# Patient Record
Sex: MALE | Race: WHITE | NOT HISPANIC OR LATINO | Employment: OTHER | URBAN - METROPOLITAN AREA
[De-identification: names, ages, dates, MRNs, and addresses within clinical notes are randomized per-mention and may not be internally consistent; named-entity substitution may affect disease eponyms.]

---

## 2023-02-09 PROBLEM — D64.9 ANEMIA: Status: ACTIVE | Noted: 2023-02-09

## 2023-02-09 PROBLEM — E04.9 NODULAR GOITER: Status: ACTIVE | Noted: 2023-02-09

## 2023-02-09 PROBLEM — B02.29 POST HERPETIC NEURALGIA: Status: ACTIVE | Noted: 2023-02-09

## 2023-02-09 PROBLEM — J30.9 ALLERGIC RHINITIS WITH POSTNASAL DRIP: Status: ACTIVE | Noted: 2023-02-09

## 2023-02-09 PROBLEM — E66.812 CLASS 2 SEVERE OBESITY WITH SERIOUS COMORBIDITY AND BODY MASS INDEX (BMI) OF 35.0 TO 35.9 IN ADULT: Status: ACTIVE | Noted: 2023-02-09

## 2023-02-09 PROBLEM — H02.409 PTOSIS: Status: ACTIVE | Noted: 2023-02-09

## 2023-02-09 PROBLEM — M47.812 CERVICAL OSTEOARTHRITIS: Status: ACTIVE | Noted: 2023-02-09

## 2023-02-09 PROBLEM — E66.01 CLASS 2 SEVERE OBESITY WITH SERIOUS COMORBIDITY AND BODY MASS INDEX (BMI) OF 35.0 TO 35.9 IN ADULT (MULTI): Status: ACTIVE | Noted: 2023-02-09

## 2023-02-09 PROBLEM — G62.9 PERIPHERAL POLYNEUROPATHY: Status: ACTIVE | Noted: 2023-02-09

## 2023-02-09 PROBLEM — G25.81 RESTLESS LEG: Status: ACTIVE | Noted: 2023-02-09

## 2023-02-09 PROBLEM — C43.9 MELANOMA (MULTI): Status: ACTIVE | Noted: 2023-02-09

## 2023-02-09 PROBLEM — E04.1 COLLOID THYROID NODULE: Status: ACTIVE | Noted: 2023-02-09

## 2023-02-09 PROBLEM — R97.20 BPH WITH ELEVATED PSA: Status: ACTIVE | Noted: 2023-02-09

## 2023-02-09 PROBLEM — N40.0 BPH WITH ELEVATED PSA: Status: ACTIVE | Noted: 2023-02-09

## 2023-02-09 PROBLEM — G70.00 MYASTHENIA GRAVIS (MULTI): Status: ACTIVE | Noted: 2023-02-09

## 2023-02-09 PROBLEM — G56.20 ULNAR NEUROPATHY: Status: ACTIVE | Noted: 2023-02-09

## 2023-02-09 PROBLEM — K21.9 GERD (GASTROESOPHAGEAL REFLUX DISEASE): Status: ACTIVE | Noted: 2023-02-09

## 2023-02-09 PROBLEM — R09.82 ALLERGIC RHINITIS WITH POSTNASAL DRIP: Status: ACTIVE | Noted: 2023-02-09

## 2023-02-09 PROBLEM — R91.8 LUNG NODULES: Status: ACTIVE | Noted: 2023-02-09

## 2023-02-09 PROBLEM — I10 HYPERTENSION: Status: ACTIVE | Noted: 2023-02-09

## 2023-02-09 PROBLEM — L02.31 ABSCESS OF BUTTOCK: Status: ACTIVE | Noted: 2023-02-09

## 2023-02-09 PROBLEM — E11.40 CONTROLLED DIABETES MELLITUS WITH DIABETIC NEUROPATHY (MULTI): Status: ACTIVE | Noted: 2023-02-09

## 2023-02-09 PROBLEM — R26.89 POOR BALANCE: Status: ACTIVE | Noted: 2023-02-09

## 2023-02-09 PROBLEM — N39.41 URGE INCONTINENCE OF URINE: Status: ACTIVE | Noted: 2023-02-09

## 2023-02-09 PROBLEM — R39.12 WEAK URINARY STREAM: Status: ACTIVE | Noted: 2023-02-09

## 2023-02-09 PROBLEM — J45.909 AIRWAY HYPERREACTIVITY (HHS-HCC): Status: ACTIVE | Noted: 2023-02-09

## 2023-02-09 PROBLEM — N52.9 MALE ERECTILE DISORDER: Status: ACTIVE | Noted: 2023-02-09

## 2023-02-09 PROBLEM — E78.5 HYPERLIPIDEMIA: Status: ACTIVE | Noted: 2023-02-09

## 2023-02-09 RX ORDER — AMLODIPINE BESYLATE 10 MG/1
1 TABLET ORAL DAILY
COMMUNITY
Start: 2018-04-09 | End: 2023-06-26

## 2023-02-09 RX ORDER — KETOCONAZOLE 20 MG/ML
SHAMPOO, SUSPENSION TOPICAL
COMMUNITY
Start: 2021-08-12

## 2023-02-09 RX ORDER — MULTIVIT-MIN/IRON/FOLIC ACID/K 18-600-40
CAPSULE ORAL
COMMUNITY

## 2023-02-09 RX ORDER — PYRIDOSTIGMINE BROMIDE 60 MG/1
60 TABLET ORAL
COMMUNITY
Start: 2018-05-21

## 2023-02-09 RX ORDER — LOSARTAN POTASSIUM AND HYDROCHLOROTHIAZIDE 25; 100 MG/1; MG/1
1 TABLET ORAL DAILY
COMMUNITY
Start: 2017-09-06 | End: 2023-10-17

## 2023-02-09 RX ORDER — VITS A,C,E/LUTEIN/MINERALS 300MCG-200
TABLET ORAL
COMMUNITY

## 2023-02-09 RX ORDER — MULTIVIT WITH MINERALS/HERBS
TABLET ORAL
COMMUNITY

## 2023-02-09 RX ORDER — PRAVASTATIN SODIUM 20 MG/1
1 TABLET ORAL DAILY
COMMUNITY
Start: 2015-10-30 | End: 2023-03-16 | Stop reason: SDUPTHER

## 2023-02-09 RX ORDER — BLOOD SUGAR DIAGNOSTIC
STRIP MISCELLANEOUS
COMMUNITY
Start: 2019-07-15

## 2023-02-09 RX ORDER — METFORMIN HYDROCHLORIDE 500 MG/1
TABLET ORAL
COMMUNITY
Start: 2015-10-30 | End: 2023-03-22 | Stop reason: SDUPTHER

## 2023-03-16 DIAGNOSIS — E78.5 HYPERLIPIDEMIA, UNSPECIFIED HYPERLIPIDEMIA TYPE: Primary | ICD-10-CM

## 2023-03-16 RX ORDER — PRAVASTATIN SODIUM 20 MG/1
TABLET ORAL
Qty: 90 TABLET | Refills: 0 | Status: SHIPPED | OUTPATIENT
Start: 2023-03-16 | End: 2023-06-14

## 2023-03-22 ENCOUNTER — TELEPHONE (OUTPATIENT)
Dept: PRIMARY CARE | Facility: CLINIC | Age: 73
End: 2023-03-22

## 2023-03-22 ENCOUNTER — OFFICE VISIT (OUTPATIENT)
Dept: PRIMARY CARE | Facility: CLINIC | Age: 73
End: 2023-03-22
Payer: MEDICARE

## 2023-03-22 VITALS
HEART RATE: 88 BPM | TEMPERATURE: 97.3 F | HEIGHT: 68 IN | DIASTOLIC BLOOD PRESSURE: 70 MMHG | BODY MASS INDEX: 35.16 KG/M2 | WEIGHT: 232 LBS | SYSTOLIC BLOOD PRESSURE: 130 MMHG

## 2023-03-22 DIAGNOSIS — C43.59 MALIGNANT MELANOMA OF TORSO EXCLUDING BREAST (MULTI): ICD-10-CM

## 2023-03-22 DIAGNOSIS — Z12.5 PROSTATE CANCER SCREENING: ICD-10-CM

## 2023-03-22 DIAGNOSIS — E11.40 CONTROLLED TYPE 2 DIABETES MELLITUS WITH DIABETIC NEUROPATHY, WITHOUT LONG-TERM CURRENT USE OF INSULIN (MULTI): ICD-10-CM

## 2023-03-22 DIAGNOSIS — G70.00 MYASTHENIA GRAVIS (MULTI): ICD-10-CM

## 2023-03-22 DIAGNOSIS — J01.90 ACUTE NON-RECURRENT SINUSITIS, UNSPECIFIED LOCATION: Primary | ICD-10-CM

## 2023-03-22 DIAGNOSIS — I10 PRIMARY HYPERTENSION: ICD-10-CM

## 2023-03-22 DIAGNOSIS — E66.01 CLASS 2 SEVERE OBESITY DUE TO EXCESS CALORIES WITH SERIOUS COMORBIDITY AND BODY MASS INDEX (BMI) OF 35.0 TO 35.9 IN ADULT (MULTI): ICD-10-CM

## 2023-03-22 PROBLEM — L02.31 ABSCESS OF BUTTOCK: Status: RESOLVED | Noted: 2023-02-09 | Resolved: 2023-03-22

## 2023-03-22 LAB — POC HEMOGLOBIN A1C: 8.2 % (ref 4.2–6.5)

## 2023-03-22 PROCEDURE — 3008F BODY MASS INDEX DOCD: CPT | Performed by: FAMILY MEDICINE

## 2023-03-22 PROCEDURE — 1160F RVW MEDS BY RX/DR IN RCRD: CPT | Performed by: FAMILY MEDICINE

## 2023-03-22 PROCEDURE — 1157F ADVNC CARE PLAN IN RCRD: CPT | Performed by: FAMILY MEDICINE

## 2023-03-22 PROCEDURE — 1159F MED LIST DOCD IN RCRD: CPT | Performed by: FAMILY MEDICINE

## 2023-03-22 PROCEDURE — 3078F DIAST BP <80 MM HG: CPT | Performed by: FAMILY MEDICINE

## 2023-03-22 PROCEDURE — 1036F TOBACCO NON-USER: CPT | Performed by: FAMILY MEDICINE

## 2023-03-22 PROCEDURE — 99214 OFFICE O/P EST MOD 30 MIN: CPT | Performed by: FAMILY MEDICINE

## 2023-03-22 PROCEDURE — 3075F SYST BP GE 130 - 139MM HG: CPT | Performed by: FAMILY MEDICINE

## 2023-03-22 PROCEDURE — 83036 HEMOGLOBIN GLYCOSYLATED A1C: CPT | Performed by: FAMILY MEDICINE

## 2023-03-22 RX ORDER — METFORMIN HYDROCHLORIDE 500 MG/1
1000 TABLET ORAL
Qty: 360 TABLET | Refills: 1 | Status: SHIPPED | OUTPATIENT
Start: 2023-03-22 | End: 2023-07-03 | Stop reason: SDUPTHER

## 2023-03-22 RX ORDER — AMOXICILLIN 875 MG/1
875 TABLET, FILM COATED ORAL 2 TIMES DAILY
Qty: 20 TABLET | Refills: 0 | Status: SHIPPED | OUTPATIENT
Start: 2023-03-22 | End: 2023-04-01

## 2023-03-22 RX ORDER — METFORMIN HYDROCHLORIDE 500 MG/1
TABLET ORAL
Qty: 360 TABLET | Refills: 1 | Status: SHIPPED | OUTPATIENT
Start: 2023-03-22 | End: 2023-03-22 | Stop reason: SDUPTHER

## 2023-03-22 ASSESSMENT — ENCOUNTER SYMPTOMS: SINUS PRESSURE: 1

## 2023-03-22 ASSESSMENT — PATIENT HEALTH QUESTIONNAIRE - PHQ9
SUM OF ALL RESPONSES TO PHQ9 QUESTIONS 1 & 2: 0
2. FEELING DOWN, DEPRESSED OR HOPELESS: NOT AT ALL
1. LITTLE INTEREST OR PLEASURE IN DOING THINGS: NOT AT ALL

## 2023-03-22 NOTE — PROGRESS NOTES
"Subjective   Patient ID: Gavin Hawkins Jr. is a 72 y.o. male who presents for Diabetes and Sinus Problem (Went to urgent care 10 days ago finished doxycycline still having s/s.  A1C=8.2).    2 weeks  congestion yellow PND   has diarrhea this am also  finished doxycycline 4 days ago, watery   frequent BM   no nausea abdominal pain   Covid negative     Sinusitis  The current episode started 1 to 4 weeks ago. The problem is unchanged. There has been no fever. Associated symptoms include congestion and sinus pressure.        Review of Systems   HENT:  Positive for congestion and sinus pressure.        Objective   /70 (BP Location: Right arm, Patient Position: Sitting, BP Cuff Size: Large adult)   Pulse 88   Temp 36.3 °C (97.3 °F) (Temporal)   Ht 1.727 m (5' 8\")   Wt 105 kg (232 lb)   BMI 35.28 kg/m²     Physical Exam  Vitals and nursing note reviewed.   Constitutional:       General: He is not in acute distress.     Appearance: He is not ill-appearing.   HENT:      Head: Normocephalic and atraumatic.      Mouth/Throat:      Mouth: Mucous membranes are moist.   Eyes:      Conjunctiva/sclera: Conjunctivae normal.   Cardiovascular:      Rate and Rhythm: Normal rate and regular rhythm.      Heart sounds: Normal heart sounds.   Pulmonary:      Effort: Pulmonary effort is normal.      Breath sounds: Normal breath sounds.   Skin:     General: Skin is warm.   Neurological:      Mental Status: He is alert.   Psychiatric:         Mood and Affect: Mood normal.         Thought Content: Thought content normal.         Judgment: Judgment normal.         Assessment/Plan   Problem List Items Addressed This Visit          Nervous    Controlled diabetes mellitus with diabetic neuropathy (CMS/HCC)    Relevant Medications    metFORMIN (Glucophage) 500 mg tablet    Other Relevant Orders    POCT glycosylated hemoglobin (Hb A1C) manually resulted    Myasthenia gravis (CMS/[facility])     Seeing neurology  stable            Circulatory "    Hypertension       Endocrine/Metabolic    Class 2 severe obesity with serious comorbidity and body mass index (BMI) of 35.0 to 35.9 in adult (CMS/[facility])     Working on weight loss            Other    Melanoma (CMS/[facility])     2023 Back           Other Visit Diagnoses       Acute non-recurrent sinusitis, unspecified location    -  Primary    Relevant Medications    amoxicillin (Amoxil) 875 mg tablet

## 2023-06-13 DIAGNOSIS — E78.5 HYPERLIPIDEMIA, UNSPECIFIED HYPERLIPIDEMIA TYPE: ICD-10-CM

## 2023-06-14 RX ORDER — PRAVASTATIN SODIUM 20 MG/1
TABLET ORAL
Qty: 90 TABLET | Refills: 1 | Status: SHIPPED | OUTPATIENT
Start: 2023-06-14 | End: 2024-04-09

## 2023-06-25 DIAGNOSIS — I10 PRIMARY HYPERTENSION: ICD-10-CM

## 2023-06-26 ENCOUNTER — LAB (OUTPATIENT)
Dept: LAB | Facility: LAB | Age: 73
End: 2023-06-26
Payer: MEDICARE

## 2023-06-26 DIAGNOSIS — E11.40 CONTROLLED TYPE 2 DIABETES MELLITUS WITH DIABETIC NEUROPATHY, WITHOUT LONG-TERM CURRENT USE OF INSULIN (MULTI): ICD-10-CM

## 2023-06-26 LAB
ALANINE AMINOTRANSFERASE (SGPT) (U/L) IN SER/PLAS: 24 U/L (ref 10–52)
ALBUMIN (G/DL) IN SER/PLAS: 4.4 G/DL (ref 3.4–5)
ALKALINE PHOSPHATASE (U/L) IN SER/PLAS: 59 U/L (ref 33–136)
ANION GAP IN SER/PLAS: 12 MMOL/L (ref 10–20)
ASPARTATE AMINOTRANSFERASE (SGOT) (U/L) IN SER/PLAS: 17 U/L (ref 9–39)
BILIRUBIN TOTAL (MG/DL) IN SER/PLAS: 0.5 MG/DL (ref 0–1.2)
CALCIUM (MG/DL) IN SER/PLAS: 8.8 MG/DL (ref 8.6–10.3)
CARBON DIOXIDE, TOTAL (MMOL/L) IN SER/PLAS: 29 MMOL/L (ref 21–32)
CHLORIDE (MMOL/L) IN SER/PLAS: 104 MMOL/L (ref 98–107)
CHOLESTEROL (MG/DL) IN SER/PLAS: 146 MG/DL (ref 0–199)
CHOLESTEROL IN HDL (MG/DL) IN SER/PLAS: 53.8 MG/DL
CHOLESTEROL/HDL RATIO: 2.7
CREATININE (MG/DL) IN SER/PLAS: 1.02 MG/DL (ref 0.5–1.3)
ERYTHROCYTE DISTRIBUTION WIDTH (RATIO) BY AUTOMATED COUNT: 13.1 % (ref 11.5–14.5)
ERYTHROCYTE MEAN CORPUSCULAR HEMOGLOBIN CONCENTRATION (G/DL) BY AUTOMATED: 32.3 G/DL (ref 32–36)
ERYTHROCYTE MEAN CORPUSCULAR VOLUME (FL) BY AUTOMATED COUNT: 86 FL (ref 80–100)
ERYTHROCYTES (10*6/UL) IN BLOOD BY AUTOMATED COUNT: 4.75 X10E12/L (ref 4.5–5.9)
GFR MALE: 77 ML/MIN/1.73M2
GLUCOSE (MG/DL) IN SER/PLAS: 155 MG/DL (ref 74–99)
HEMATOCRIT (%) IN BLOOD BY AUTOMATED COUNT: 40.9 % (ref 41–52)
HEMOGLOBIN (G/DL) IN BLOOD: 13.2 G/DL (ref 13.5–17.5)
LDL: 68 MG/DL (ref 0–99)
LEUKOCYTES (10*3/UL) IN BLOOD BY AUTOMATED COUNT: 8.5 X10E9/L (ref 4.4–11.3)
PLATELETS (10*3/UL) IN BLOOD AUTOMATED COUNT: 204 X10E9/L (ref 150–450)
POTASSIUM (MMOL/L) IN SER/PLAS: 4.2 MMOL/L (ref 3.5–5.3)
PROTEIN TOTAL: 6.7 G/DL (ref 6.4–8.2)
SODIUM (MMOL/L) IN SER/PLAS: 141 MMOL/L (ref 136–145)
TRIGLYCERIDE (MG/DL) IN SER/PLAS: 119 MG/DL (ref 0–149)
UREA NITROGEN (MG/DL) IN SER/PLAS: 10 MG/DL (ref 6–23)
VLDL: 24 MG/DL (ref 0–40)

## 2023-06-26 PROCEDURE — 36415 COLL VENOUS BLD VENIPUNCTURE: CPT

## 2023-06-26 PROCEDURE — 80053 COMPREHEN METABOLIC PANEL: CPT

## 2023-06-26 PROCEDURE — 85027 COMPLETE CBC AUTOMATED: CPT

## 2023-06-26 PROCEDURE — 80061 LIPID PANEL: CPT

## 2023-06-26 RX ORDER — AMLODIPINE BESYLATE 10 MG/1
TABLET ORAL
Qty: 90 TABLET | Refills: 0 | Status: SHIPPED | OUTPATIENT
Start: 2023-06-26 | End: 2023-07-03 | Stop reason: SDUPTHER

## 2023-06-27 ENCOUNTER — APPOINTMENT (OUTPATIENT)
Dept: PRIMARY CARE | Facility: CLINIC | Age: 73
End: 2023-06-27
Payer: MEDICARE

## 2023-07-03 ENCOUNTER — OFFICE VISIT (OUTPATIENT)
Dept: PRIMARY CARE | Facility: CLINIC | Age: 73
End: 2023-07-03
Payer: MEDICARE

## 2023-07-03 VITALS
TEMPERATURE: 97.2 F | RESPIRATION RATE: 16 BRPM | BODY MASS INDEX: 35.43 KG/M2 | SYSTOLIC BLOOD PRESSURE: 118 MMHG | HEART RATE: 88 BPM | DIASTOLIC BLOOD PRESSURE: 70 MMHG | WEIGHT: 233 LBS

## 2023-07-03 DIAGNOSIS — Z12.5 PROSTATE CANCER SCREENING: ICD-10-CM

## 2023-07-03 DIAGNOSIS — E11.40 CONTROLLED TYPE 2 DIABETES MELLITUS WITH DIABETIC NEUROPATHY, WITHOUT LONG-TERM CURRENT USE OF INSULIN (MULTI): Primary | ICD-10-CM

## 2023-07-03 DIAGNOSIS — D64.9 ANEMIA, UNSPECIFIED TYPE: ICD-10-CM

## 2023-07-03 DIAGNOSIS — I10 PRIMARY HYPERTENSION: ICD-10-CM

## 2023-07-03 LAB — POC HEMOGLOBIN A1C: 7.7 % (ref 4.2–6.5)

## 2023-07-03 PROCEDURE — 3074F SYST BP LT 130 MM HG: CPT | Performed by: FAMILY MEDICINE

## 2023-07-03 PROCEDURE — 99214 OFFICE O/P EST MOD 30 MIN: CPT | Performed by: FAMILY MEDICINE

## 2023-07-03 PROCEDURE — 1036F TOBACCO NON-USER: CPT | Performed by: FAMILY MEDICINE

## 2023-07-03 PROCEDURE — 1159F MED LIST DOCD IN RCRD: CPT | Performed by: FAMILY MEDICINE

## 2023-07-03 PROCEDURE — 1157F ADVNC CARE PLAN IN RCRD: CPT | Performed by: FAMILY MEDICINE

## 2023-07-03 PROCEDURE — 1160F RVW MEDS BY RX/DR IN RCRD: CPT | Performed by: FAMILY MEDICINE

## 2023-07-03 PROCEDURE — 3008F BODY MASS INDEX DOCD: CPT | Performed by: FAMILY MEDICINE

## 2023-07-03 PROCEDURE — 83036 HEMOGLOBIN GLYCOSYLATED A1C: CPT | Performed by: FAMILY MEDICINE

## 2023-07-03 PROCEDURE — 3078F DIAST BP <80 MM HG: CPT | Performed by: FAMILY MEDICINE

## 2023-07-03 RX ORDER — SEMAGLUTIDE 0.68 MG/ML
INJECTION, SOLUTION SUBCUTANEOUS
Qty: 3 ML | Refills: 3 | Status: SHIPPED | OUTPATIENT
Start: 2023-07-03 | End: 2023-10-03 | Stop reason: ALTCHOICE

## 2023-07-03 RX ORDER — METFORMIN HYDROCHLORIDE 500 MG/1
500 TABLET ORAL
Qty: 180 TABLET | Refills: 1
Start: 2023-07-03 | End: 2023-08-24 | Stop reason: SDUPTHER

## 2023-07-03 RX ORDER — AMLODIPINE BESYLATE 10 MG/1
10 TABLET ORAL DAILY
Qty: 90 TABLET | Refills: 1 | Status: SHIPPED | OUTPATIENT
Start: 2023-07-03 | End: 2024-01-05 | Stop reason: SDUPTHER

## 2023-07-03 ASSESSMENT — PATIENT HEALTH QUESTIONNAIRE - PHQ9
2. FEELING DOWN, DEPRESSED OR HOPELESS: NOT AT ALL
SUM OF ALL RESPONSES TO PHQ9 QUESTIONS 1 & 2: 0
1. LITTLE INTEREST OR PLEASURE IN DOING THINGS: NOT AT ALL

## 2023-07-03 NOTE — PROGRESS NOTES
Subjective   Patient ID: Gavin Hawkins Jr. is a 73 y.o. male who presents for Diabetes.  Stools are more loose     CPAP working well          Review of Systems    Objective   /70   Pulse 88   Temp 36.2 °C (97.2 °F) (Temporal)   Resp 16   Wt 106 kg (233 lb)   BMI 35.43 kg/m²     Physical Exam  Vitals and nursing note reviewed.   Constitutional:       General: He is not in acute distress.     Appearance: He is not ill-appearing.   HENT:      Head: Normocephalic and atraumatic.      Mouth/Throat:      Mouth: Mucous membranes are moist.   Eyes:      Conjunctiva/sclera: Conjunctivae normal.   Cardiovascular:      Rate and Rhythm: Normal rate and regular rhythm.      Heart sounds: Normal heart sounds.   Pulmonary:      Effort: Pulmonary effort is normal.      Breath sounds: Normal breath sounds.   Skin:     General: Skin is warm.   Neurological:      Mental Status: He is alert.   Psychiatric:         Mood and Affect: Mood normal.         Thought Content: Thought content normal.         Judgment: Judgment normal.         Assessment/Plan   Problem List Items Addressed This Visit       Anemia    Relevant Orders    CBC    Iron and TIBC    Ferritin    Vitamin B12    Folate    Reticulocytes    Controlled diabetes mellitus with diabetic neuropathy (CMS/HCC) - Primary    Relevant Medications    semaglutide (Ozempic) 0.25 mg or 0.5 mg (2 mg/3 mL) pen injector    metFORMIN (Glucophage) 500 mg tablet    Other Relevant Orders    POCT glycosylated hemoglobin (Hb A1C) manually resulted (Completed)    Hemoglobin A1C    Follow Up In [facility] - PCP - Established    Hypertension    Relevant Medications    amLODIPine (Norvasc) 10 mg tablet     Other Visit Diagnoses       Prostate cancer screening        Relevant Orders    Prostate Specific Antigen

## 2023-07-03 NOTE — PATIENT INSTRUCTIONS
Get your labs before your next appointment. The order is in [employer]. You can go to any  lab to have it done.

## 2023-08-21 ENCOUNTER — DOCUMENTATION (OUTPATIENT)
Dept: PRIMARY CARE | Facility: CLINIC | Age: 73
End: 2023-08-21
Payer: MEDICARE

## 2023-08-22 ENCOUNTER — PATIENT OUTREACH (OUTPATIENT)
Dept: PRIMARY CARE | Facility: CLINIC | Age: 73
End: 2023-08-22
Payer: MEDICARE

## 2023-08-22 NOTE — PROGRESS NOTES
Discharge Facility: [facility]  Discharge Diagnosis:Coffee ground emesis  Admission Date: 8/20/2023  Discharge Date: 8/22/2023    PCP Appointment Date: NONE  Specialist Appointment Date: NONE  Hospital Encounter and Summary: Linked     Left message x 2 -no call back

## 2023-08-24 ENCOUNTER — LAB (OUTPATIENT)
Dept: LAB | Facility: LAB | Age: 73
End: 2023-08-24
Payer: MEDICARE

## 2023-08-24 ENCOUNTER — OFFICE VISIT (OUTPATIENT)
Dept: PRIMARY CARE | Facility: CLINIC | Age: 73
End: 2023-08-24
Payer: MEDICARE

## 2023-08-24 VITALS
WEIGHT: 220.6 LBS | DIASTOLIC BLOOD PRESSURE: 76 MMHG | HEART RATE: 80 BPM | BODY MASS INDEX: 33.43 KG/M2 | TEMPERATURE: 97.5 F | SYSTOLIC BLOOD PRESSURE: 122 MMHG | RESPIRATION RATE: 16 BRPM | HEIGHT: 68 IN

## 2023-08-24 DIAGNOSIS — K92.2 UPPER GASTROINTESTINAL BLEED: ICD-10-CM

## 2023-08-24 DIAGNOSIS — E11.40 CONTROLLED TYPE 2 DIABETES MELLITUS WITH DIABETIC NEUROPATHY, WITHOUT LONG-TERM CURRENT USE OF INSULIN (MULTI): ICD-10-CM

## 2023-08-24 DIAGNOSIS — K92.2 UPPER GASTROINTESTINAL BLEED: Primary | ICD-10-CM

## 2023-08-24 LAB
ERYTHROCYTE DISTRIBUTION WIDTH (RATIO) BY AUTOMATED COUNT: 13.7 % (ref 11.5–14.5)
ERYTHROCYTE MEAN CORPUSCULAR HEMOGLOBIN CONCENTRATION (G/DL) BY AUTOMATED: 32.7 G/DL (ref 32–36)
ERYTHROCYTE MEAN CORPUSCULAR VOLUME (FL) BY AUTOMATED COUNT: 87 FL (ref 80–100)
ERYTHROCYTES (10*6/UL) IN BLOOD BY AUTOMATED COUNT: 4.54 X10E12/L (ref 4.5–5.9)
HEMATOCRIT (%) IN BLOOD BY AUTOMATED COUNT: 39.7 % (ref 41–52)
HEMOGLOBIN (G/DL) IN BLOOD: 13 G/DL (ref 13.5–17.5)
LEUKOCYTES (10*3/UL) IN BLOOD BY AUTOMATED COUNT: 9.9 X10E9/L (ref 4.4–11.3)
PLATELETS (10*3/UL) IN BLOOD AUTOMATED COUNT: 224 X10E9/L (ref 150–450)

## 2023-08-24 PROCEDURE — 99213 OFFICE O/P EST LOW 20 MIN: CPT | Performed by: FAMILY MEDICINE

## 2023-08-24 PROCEDURE — 1125F AMNT PAIN NOTED PAIN PRSNT: CPT | Performed by: FAMILY MEDICINE

## 2023-08-24 PROCEDURE — 1160F RVW MEDS BY RX/DR IN RCRD: CPT | Performed by: FAMILY MEDICINE

## 2023-08-24 PROCEDURE — 3008F BODY MASS INDEX DOCD: CPT | Performed by: FAMILY MEDICINE

## 2023-08-24 PROCEDURE — 1036F TOBACCO NON-USER: CPT | Performed by: FAMILY MEDICINE

## 2023-08-24 PROCEDURE — 3074F SYST BP LT 130 MM HG: CPT | Performed by: FAMILY MEDICINE

## 2023-08-24 PROCEDURE — 1157F ADVNC CARE PLAN IN RCRD: CPT | Performed by: FAMILY MEDICINE

## 2023-08-24 PROCEDURE — 1159F MED LIST DOCD IN RCRD: CPT | Performed by: FAMILY MEDICINE

## 2023-08-24 PROCEDURE — 85027 COMPLETE CBC AUTOMATED: CPT

## 2023-08-24 PROCEDURE — 3078F DIAST BP <80 MM HG: CPT | Performed by: FAMILY MEDICINE

## 2023-08-24 PROCEDURE — 36415 COLL VENOUS BLD VENIPUNCTURE: CPT

## 2023-08-24 RX ORDER — OMEPRAZOLE 40 MG/1
40 CAPSULE, DELAYED RELEASE ORAL
Qty: 30 CAPSULE | Refills: 1
Start: 2023-08-24 | End: 2024-05-17 | Stop reason: SDUPTHER

## 2023-08-24 RX ORDER — METFORMIN HYDROCHLORIDE 1000 MG/1
1000 TABLET ORAL
Qty: 180 TABLET | Refills: 1
Start: 2023-08-24 | End: 2023-12-01 | Stop reason: SDUPTHER

## 2023-08-24 NOTE — PROGRESS NOTES
"Subjective   Patient ID: Gavin Hawkins Jr. is a 73 y.o. male who presents for Follow-up (Patient here for ER F/U from [facility] on 8/20/23 for GI bleed. ).    Burped and vomited coffee ground   went to the ER   ?from Ozempic   wants to stop and see if improved  diarrhea worsening after started Ozempic    Seeing GI next month          Review of Systems    Objective   /76   Pulse 80   Temp 36.4 °C (97.5 °F)   Resp 16   Ht 1.727 m (5' 8\")   Wt 100 kg (220 lb 9.6 oz)   BMI 33.54 kg/m²     Physical Exam  Vitals and nursing note reviewed.   Constitutional:       General: He is not in acute distress.     Appearance: He is not ill-appearing.   HENT:      Head: Normocephalic and atraumatic.      Mouth/Throat:      Mouth: Mucous membranes are moist.   Eyes:      Conjunctiva/sclera: Conjunctivae normal.   Cardiovascular:      Rate and Rhythm: Normal rate and regular rhythm.      Heart sounds: Normal heart sounds.   Pulmonary:      Effort: Pulmonary effort is normal.      Breath sounds: Normal breath sounds.   Abdominal:      Tenderness: There is no abdominal tenderness.   Skin:     General: Skin is warm.   Neurological:      Mental Status: He is alert.   Psychiatric:         Mood and Affect: Mood normal.         Thought Content: Thought content normal.         Judgment: Judgment normal.         Assessment/Plan   Problem List Items Addressed This Visit       Controlled diabetes mellitus with diabetic neuropathy (CMS/HCC)    Relevant Medications    metFORMIN (Glucophage) 1,000 mg tablet    Upper gastrointestinal bleed - Primary    Relevant Medications    omeprazole (PriLOSEC) 40 mg DR capsule    Other Relevant Orders    CBC          "
(3) walks occasionally

## 2023-09-01 ENCOUNTER — PATIENT OUTREACH (OUTPATIENT)
Dept: PRIMARY CARE | Facility: CLINIC | Age: 73
End: 2023-09-01
Payer: MEDICARE

## 2023-09-01 NOTE — PROGRESS NOTES
Call regarding appt. with PCP on 8/24/2023 after hospitalization.  At time of outreach call the patient feels as if their condition has improved since last visit.  Reviewed the PCP appointment with the pt and addressed any questions or concerns.    Admission

## 2023-10-02 ENCOUNTER — PATIENT OUTREACH (OUTPATIENT)
Dept: PRIMARY CARE | Facility: CLINIC | Age: 73
End: 2023-10-02
Payer: MEDICARE

## 2023-10-02 NOTE — PROGRESS NOTES
Unable to reach patient for one month post discharge follow up call.   LVM with call back number for patient to call if needed        normal... Well appearing, awake, alert, oriented to person, place, time/situation and in no apparent distress.

## 2023-10-03 ENCOUNTER — LAB (OUTPATIENT)
Dept: LAB | Facility: LAB | Age: 73
End: 2023-10-03
Payer: MEDICARE

## 2023-10-03 ENCOUNTER — OFFICE VISIT (OUTPATIENT)
Dept: PRIMARY CARE | Facility: CLINIC | Age: 73
End: 2023-10-03
Payer: MEDICARE

## 2023-10-03 VITALS
DIASTOLIC BLOOD PRESSURE: 74 MMHG | HEART RATE: 78 BPM | HEIGHT: 68 IN | WEIGHT: 227.4 LBS | SYSTOLIC BLOOD PRESSURE: 130 MMHG | TEMPERATURE: 97.6 F | BODY MASS INDEX: 34.46 KG/M2 | RESPIRATION RATE: 16 BRPM

## 2023-10-03 DIAGNOSIS — E11.40 CONTROLLED TYPE 2 DIABETES MELLITUS WITH DIABETIC NEUROPATHY, WITHOUT LONG-TERM CURRENT USE OF INSULIN (MULTI): Primary | ICD-10-CM

## 2023-10-03 DIAGNOSIS — Z12.5 PROSTATE CANCER SCREENING: ICD-10-CM

## 2023-10-03 DIAGNOSIS — D64.9 ANEMIA, UNSPECIFIED TYPE: ICD-10-CM

## 2023-10-03 DIAGNOSIS — E11.40 CONTROLLED TYPE 2 DIABETES MELLITUS WITH DIABETIC NEUROPATHY, WITHOUT LONG-TERM CURRENT USE OF INSULIN (MULTI): ICD-10-CM

## 2023-10-03 DIAGNOSIS — G70.00 MYASTHENIA GRAVIS (MULTI): ICD-10-CM

## 2023-10-03 DIAGNOSIS — Z23 IMMUNIZATION DUE: ICD-10-CM

## 2023-10-03 DIAGNOSIS — Z00.00 ROUTINE GENERAL MEDICAL EXAMINATION AT A HEALTH CARE FACILITY: ICD-10-CM

## 2023-10-03 LAB
CREAT UR-MCNC: 112.6 MG/DL (ref 20–370)
ERYTHROCYTE [DISTWIDTH] IN BLOOD BY AUTOMATED COUNT: 13.9 % (ref 11.5–14.5)
FERRITIN SERPL-MCNC: 46 NG/ML (ref 20–300)
FOLATE SERPL-MCNC: >22.3 NG/ML
HCT VFR BLD AUTO: 40.2 % (ref 41–52)
HGB BLD-MCNC: 13 G/DL (ref 13.5–17.5)
HGB RETIC QN: 32 PG (ref 28–38)
IMMATURE RETIC FRACTION: 13.2 %
IRON SATN MFR SERPL: 20 % (ref 25–45)
IRON SERPL-MCNC: 76 UG/DL (ref 35–150)
MCH RBC QN AUTO: 28.3 PG (ref 26–34)
MCHC RBC AUTO-ENTMCNC: 32.3 G/DL (ref 32–36)
MCV RBC AUTO: 88 FL (ref 80–100)
MICROALBUMIN UR-MCNC: 22.7 MG/L
MICROALBUMIN/CREAT UR: 20.2 UG/MG CREAT
NRBC BLD-RTO: 0 /100 WBCS (ref 0–0)
PLATELET # BLD AUTO: 210 X10*3/UL (ref 150–450)
PMV BLD AUTO: 10.7 FL (ref 7.5–11.5)
POC HEMOGLOBIN A1C: 6.9 % (ref 4.2–6.5)
PSA SERPL-MCNC: 2.58 NG/ML
RBC # BLD AUTO: 4.59 X10*6/UL (ref 4.5–5.9)
RETICS #: 0.08 X10*6/UL (ref 0.02–0.11)
RETICS/RBC NFR AUTO: 1.8 % (ref 0.5–2)
TIBC SERPL-MCNC: 389 UG/DL (ref 240–445)
UIBC SERPL-MCNC: 313 UG/DL (ref 110–370)
WBC # BLD AUTO: 8.2 X10*3/UL (ref 4.4–11.3)

## 2023-10-03 PROCEDURE — 99213 OFFICE O/P EST LOW 20 MIN: CPT | Performed by: FAMILY MEDICINE

## 2023-10-03 PROCEDURE — 90662 IIV NO PRSV INCREASED AG IM: CPT | Performed by: FAMILY MEDICINE

## 2023-10-03 PROCEDURE — 1036F TOBACCO NON-USER: CPT | Performed by: FAMILY MEDICINE

## 2023-10-03 PROCEDURE — 3078F DIAST BP <80 MM HG: CPT | Performed by: FAMILY MEDICINE

## 2023-10-03 PROCEDURE — 3075F SYST BP GE 130 - 139MM HG: CPT | Performed by: FAMILY MEDICINE

## 2023-10-03 PROCEDURE — 1125F AMNT PAIN NOTED PAIN PRSNT: CPT | Performed by: FAMILY MEDICINE

## 2023-10-03 PROCEDURE — 36415 COLL VENOUS BLD VENIPUNCTURE: CPT

## 2023-10-03 PROCEDURE — 83036 HEMOGLOBIN GLYCOSYLATED A1C: CPT | Performed by: FAMILY MEDICINE

## 2023-10-03 PROCEDURE — 3008F BODY MASS INDEX DOCD: CPT | Performed by: FAMILY MEDICINE

## 2023-10-03 PROCEDURE — 1159F MED LIST DOCD IN RCRD: CPT | Performed by: FAMILY MEDICINE

## 2023-10-03 PROCEDURE — 1160F RVW MEDS BY RX/DR IN RCRD: CPT | Performed by: FAMILY MEDICINE

## 2023-10-03 PROCEDURE — G0008 ADMIN INFLUENZA VIRUS VAC: HCPCS | Performed by: FAMILY MEDICINE

## 2023-10-03 ASSESSMENT — ENCOUNTER SYMPTOMS: DIABETIC ASSOCIATED SYMPTOMS: 0

## 2023-10-03 NOTE — PATIENT INSTRUCTIONS
You need to get the RSV vaccine at the pharmacy.    You can get the new COVID booster at the pharmacy.

## 2023-10-03 NOTE — PROGRESS NOTES
"Subjective   Patient ID: Gavin Hawkins Jr. is a 73 y.o. male who presents for Diabetes and Hypertension.    Memory doing good     Diabetes  He has type 2 diabetes mellitus. His disease course has been improving. There are no hypoglycemic associated symptoms. There are no diabetic associated symptoms. There are no hypoglycemic complications. Diabetic complications include peripheral neuropathy. An ACE inhibitor/angiotensin II receptor blocker is being taken. He does not see a podiatrist.Eye exam is current.        Review of Systems    Objective   /74   Pulse 78   Temp 36.4 °C (97.6 °F)   Resp 16   Ht 1.727 m (5' 8\")   Wt 103 kg (227 lb 6.4 oz)   BMI 34.58 kg/m²     Physical Exam    Assessment/Plan   Problem List Items Addressed This Visit             ICD-10-CM    Anemia D64.9    Relevant Orders    CBC    Folate    Iron and TIBC    Controlled diabetes mellitus with diabetic neuropathy (CMS/HCC) - Primary E11.40    Relevant Orders    POCT glycosylated hemoglobin (Hb A1C) manually resulted (Completed)    Albumin , Urine Random    Myasthenia gravis (CMS/[facility]) G70.00    Relevant Orders    Disability Placard     Other Visit Diagnoses         Codes    Immunization due     Z23    Relevant Orders    Flu vaccine, high dose seasonal, preservative free (Completed)    Prostate cancer screening     Z12.5    Relevant Orders    Prostate Specific Antigen    Routine general medical examination at a health care facility     Z00.00    Relevant Orders    Follow Up In [facility] - PCP - Medicare Annual               "

## 2023-10-04 LAB
EST. AVERAGE GLUCOSE BLD GHB EST-MCNC: 148 MG/DL
HBA1C MFR BLD: 6.8 %

## 2023-10-17 DIAGNOSIS — I10 PRIMARY HYPERTENSION: Primary | ICD-10-CM

## 2023-10-17 RX ORDER — LOSARTAN POTASSIUM AND HYDROCHLOROTHIAZIDE 25; 100 MG/1; MG/1
1 TABLET ORAL
Qty: 90 TABLET | Refills: 1 | Status: SHIPPED | OUTPATIENT
Start: 2023-10-17 | End: 2024-01-05 | Stop reason: SDUPTHER

## 2023-11-22 ENCOUNTER — PATIENT OUTREACH (OUTPATIENT)
Dept: PRIMARY CARE | Facility: CLINIC | Age: 73
End: 2023-11-22
Payer: MEDICARE

## 2023-11-22 NOTE — PROGRESS NOTES
Patient has met target of no readmission for (90) days post hospital discharge and is graduated from Transitional Care Management program at this time.

## 2023-12-01 DIAGNOSIS — E11.40 CONTROLLED TYPE 2 DIABETES MELLITUS WITH DIABETIC NEUROPATHY, WITHOUT LONG-TERM CURRENT USE OF INSULIN (MULTI): ICD-10-CM

## 2023-12-01 RX ORDER — METFORMIN HYDROCHLORIDE 1000 MG/1
1000 TABLET ORAL
Qty: 180 TABLET | Refills: 1 | Status: SHIPPED | OUTPATIENT
Start: 2023-12-01 | End: 2024-05-24

## 2024-01-05 ENCOUNTER — LAB (OUTPATIENT)
Dept: LAB | Facility: LAB | Age: 74
End: 2024-01-05
Payer: MEDICARE

## 2024-01-05 ENCOUNTER — OFFICE VISIT (OUTPATIENT)
Dept: PRIMARY CARE | Facility: CLINIC | Age: 74
End: 2024-01-05
Payer: MEDICARE

## 2024-01-05 VITALS
WEIGHT: 237 LBS | TEMPERATURE: 96 F | BODY MASS INDEX: 35.92 KG/M2 | RESPIRATION RATE: 16 BRPM | HEART RATE: 82 BPM | HEIGHT: 68 IN | DIASTOLIC BLOOD PRESSURE: 78 MMHG | SYSTOLIC BLOOD PRESSURE: 130 MMHG

## 2024-01-05 DIAGNOSIS — Z00.00 ROUTINE GENERAL MEDICAL EXAMINATION AT HEALTH CARE FACILITY: Primary | ICD-10-CM

## 2024-01-05 DIAGNOSIS — E11.40 CONTROLLED TYPE 2 DIABETES MELLITUS WITH DIABETIC NEUROPATHY, WITHOUT LONG-TERM CURRENT USE OF INSULIN (MULTI): ICD-10-CM

## 2024-01-05 DIAGNOSIS — I10 PRIMARY HYPERTENSION: ICD-10-CM

## 2024-01-05 DIAGNOSIS — Z00.00 ROUTINE GENERAL MEDICAL EXAMINATION AT A HEALTH CARE FACILITY: ICD-10-CM

## 2024-01-05 DIAGNOSIS — Z12.5 PROSTATE CANCER SCREENING: ICD-10-CM

## 2024-01-05 DIAGNOSIS — D64.9 ANEMIA, UNSPECIFIED TYPE: ICD-10-CM

## 2024-01-05 PROBLEM — R39.12 WEAK URINARY STREAM: Status: RESOLVED | Noted: 2023-02-09 | Resolved: 2024-01-05

## 2024-01-05 LAB
ALBUMIN SERPL BCP-MCNC: 4.3 G/DL (ref 3.4–5)
ALP SERPL-CCNC: 60 U/L (ref 33–136)
ALT SERPL W P-5'-P-CCNC: 37 U/L (ref 10–52)
ANION GAP SERPL CALC-SCNC: 13 MMOL/L (ref 10–20)
AST SERPL W P-5'-P-CCNC: 21 U/L (ref 9–39)
BILIRUB SERPL-MCNC: 0.6 MG/DL (ref 0–1.2)
BUN SERPL-MCNC: 16 MG/DL (ref 6–23)
CALCIUM SERPL-MCNC: 9 MG/DL (ref 8.6–10.3)
CHLORIDE SERPL-SCNC: 100 MMOL/L (ref 98–107)
CO2 SERPL-SCNC: 29 MMOL/L (ref 21–32)
CREAT SERPL-MCNC: 1 MG/DL (ref 0.5–1.3)
ERYTHROCYTE [DISTWIDTH] IN BLOOD BY AUTOMATED COUNT: 13.5 % (ref 11.5–14.5)
GFR SERPL CREATININE-BSD FRML MDRD: 79 ML/MIN/1.73M*2
GLUCOSE SERPL-MCNC: 156 MG/DL (ref 74–99)
HCT VFR BLD AUTO: 41 % (ref 41–52)
HGB BLD-MCNC: 13.5 G/DL (ref 13.5–17.5)
MCH RBC QN AUTO: 27.7 PG (ref 26–34)
MCHC RBC AUTO-ENTMCNC: 32.9 G/DL (ref 32–36)
MCV RBC AUTO: 84 FL (ref 80–100)
NRBC BLD-RTO: 0 /100 WBCS (ref 0–0)
PLATELET # BLD AUTO: 203 X10*3/UL (ref 150–450)
POC HEMOGLOBIN A1C: 8.4 % (ref 4.2–6.5)
POTASSIUM SERPL-SCNC: 3.6 MMOL/L (ref 3.5–5.3)
PROT SERPL-MCNC: 7.1 G/DL (ref 6.4–8.2)
PSA SERPL-MCNC: 2.95 NG/ML
RBC # BLD AUTO: 4.87 X10*6/UL (ref 4.5–5.9)
SODIUM SERPL-SCNC: 138 MMOL/L (ref 136–145)
WBC # BLD AUTO: 8 X10*3/UL (ref 4.4–11.3)

## 2024-01-05 PROCEDURE — G0103 PSA SCREENING: HCPCS

## 2024-01-05 PROCEDURE — 83036 HEMOGLOBIN GLYCOSYLATED A1C: CPT | Performed by: FAMILY MEDICINE

## 2024-01-05 PROCEDURE — 3075F SYST BP GE 130 - 139MM HG: CPT | Performed by: FAMILY MEDICINE

## 2024-01-05 PROCEDURE — 1036F TOBACCO NON-USER: CPT | Performed by: FAMILY MEDICINE

## 2024-01-05 PROCEDURE — 3078F DIAST BP <80 MM HG: CPT | Performed by: FAMILY MEDICINE

## 2024-01-05 PROCEDURE — 36415 COLL VENOUS BLD VENIPUNCTURE: CPT

## 2024-01-05 PROCEDURE — 1159F MED LIST DOCD IN RCRD: CPT | Performed by: FAMILY MEDICINE

## 2024-01-05 PROCEDURE — 80053 COMPREHEN METABOLIC PANEL: CPT

## 2024-01-05 PROCEDURE — 1125F AMNT PAIN NOTED PAIN PRSNT: CPT | Performed by: FAMILY MEDICINE

## 2024-01-05 PROCEDURE — 1170F FXNL STATUS ASSESSED: CPT | Performed by: FAMILY MEDICINE

## 2024-01-05 PROCEDURE — 3008F BODY MASS INDEX DOCD: CPT | Performed by: FAMILY MEDICINE

## 2024-01-05 PROCEDURE — G0439 PPPS, SUBSEQ VISIT: HCPCS | Performed by: FAMILY MEDICINE

## 2024-01-05 PROCEDURE — 1160F RVW MEDS BY RX/DR IN RCRD: CPT | Performed by: FAMILY MEDICINE

## 2024-01-05 PROCEDURE — 85027 COMPLETE CBC AUTOMATED: CPT

## 2024-01-05 RX ORDER — PIOGLITAZONEHYDROCHLORIDE 15 MG/1
15 TABLET ORAL DAILY
Qty: 90 TABLET | Refills: 1 | Status: SHIPPED | OUTPATIENT
Start: 2024-01-05

## 2024-01-05 RX ORDER — AMLODIPINE BESYLATE 10 MG/1
10 TABLET ORAL DAILY
Qty: 90 TABLET | Refills: 1 | Status: SHIPPED | OUTPATIENT
Start: 2024-01-05

## 2024-01-05 RX ORDER — LOSARTAN POTASSIUM AND HYDROCHLOROTHIAZIDE 25; 100 MG/1; MG/1
1 TABLET ORAL DAILY
Qty: 90 TABLET | Refills: 1 | Status: SHIPPED | OUTPATIENT
Start: 2024-01-05

## 2024-01-05 ASSESSMENT — ACTIVITIES OF DAILY LIVING (ADL)
TAKING_MEDICATION: INDEPENDENT
GROCERY_SHOPPING: INDEPENDENT
BATHING: INDEPENDENT
DRESSING: INDEPENDENT
MANAGING_FINANCES: INDEPENDENT
DOING_HOUSEWORK: INDEPENDENT

## 2024-01-05 ASSESSMENT — ENCOUNTER SYMPTOMS
LOSS OF SENSATION IN FEET: 0
DEPRESSION: 0
OCCASIONAL FEELINGS OF UNSTEADINESS: 0

## 2024-01-05 ASSESSMENT — PATIENT HEALTH QUESTIONNAIRE - PHQ9
SUM OF ALL RESPONSES TO PHQ9 QUESTIONS 1 & 2: 0
2. FEELING DOWN, DEPRESSED OR HOPELESS: NOT AT ALL
1. LITTLE INTEREST OR PLEASURE IN DOING THINGS: NOT AT ALL
1. LITTLE INTEREST OR PLEASURE IN DOING THINGS: NOT AT ALL
2. FEELING DOWN, DEPRESSED OR HOPELESS: NOT AT ALL
SUM OF ALL RESPONSES TO PHQ9 QUESTIONS 1 AND 2: 0

## 2024-01-05 NOTE — PROGRESS NOTES
"Subjective   Reason for Visit: Gavin Hawkins Jr. is an 73 y.o. male here for a Medicare Wellness visit.     Past Medical, Surgical, and Family History reviewed and updated in chart.    Reviewed all medications by prescribing practitioner or clinical pharmacist (such as prescriptions, OTCs, herbal therapies and supplements) and documented in the medical record.    Going to see urologist for urinary issues so wants to avoid Jardiance etc        Patient Care Team:  No Reynolds MD as PCP - General (Family Medicine)  No Reynolds MD [facility] PCP [facility] [place] [place] [facility]     Review of Systems    Objective   Vitals:  /78   Pulse 82   Temp 35.6 °C (96 °F) (Temporal)   Resp 16   Ht 1.727 m (5' 8\")   Wt 108 kg (237 lb)   BMI 36.04 kg/m²       Physical Exam  Vitals and nursing note reviewed.   Constitutional:       General: He is not in acute distress.     Appearance: He is not ill-appearing.   HENT:      Head: Normocephalic and atraumatic.      Mouth/Throat:      Mouth: Mucous membranes are moist.   Eyes:      Conjunctiva/sclera: Conjunctivae normal.   Cardiovascular:      Rate and Rhythm: Normal rate and regular rhythm.      Heart sounds: Normal heart sounds.   Pulmonary:      Effort: Pulmonary effort is normal.      Breath sounds: Normal breath sounds.   Skin:     General: Skin is warm.   Neurological:      Mental Status: He is alert.   Psychiatric:         Mood and Affect: Mood normal.         Thought Content: Thought content normal.         Judgment: Judgment normal.         Assessment/Plan   Problem List Items Addressed This Visit       Controlled diabetes mellitus with diabetic neuropathy (CMS/[facility])    Relevant Medications    pioglitazone (Actos) 15 mg tablet    Other Relevant Orders    POCT glycosylated hemoglobin (Hb A1C) manually resulted (Completed)    CBC    Comprehensive Metabolic Panel    Hypertension    Relevant Medications    losartan-hydrochlorothiazide (Hyzaar) 100-25 mg tablet    " amLODIPine (Norvasc) 10 mg tablet     Other Visit Diagnoses       Routine general medical examination at health care facility    -  Primary    Routine general medical examination at a health care facility

## 2024-01-10 DIAGNOSIS — E11.40 CONTROLLED TYPE 2 DIABETES MELLITUS WITH DIABETIC NEUROPATHY, WITHOUT LONG-TERM CURRENT USE OF INSULIN (MULTI): Primary | ICD-10-CM

## 2024-01-22 ENCOUNTER — TELEMEDICINE (OUTPATIENT)
Dept: PHARMACY | Facility: HOSPITAL | Age: 74
End: 2024-01-22
Payer: MEDICARE

## 2024-01-22 DIAGNOSIS — E11.40 CONTROLLED TYPE 2 DIABETES MELLITUS WITH DIABETIC NEUROPATHY, WITHOUT LONG-TERM CURRENT USE OF INSULIN (MULTI): ICD-10-CM

## 2024-01-22 NOTE — PROGRESS NOTES
[facility] Pharmacist Visit - Diabetes Management  Referring Provider: No Reynolds MD    Subjective   Gavin Hawkins Jr. is a 73 y.o. male who presents for No chief complaint on file..    HPI  PMH significant for obesity, peripheral polyneuropathy, melanoma, HLD, HTN, GERD, T2DM .  Patient has no known history of medullary thyroid cancer, pancreatitis, or complicated UTI.  Diagnosed 2005. Known DM complications include peripheral neuropathy and obesity.  Special needs/barriers to therapy: none    Lifestyle  Diet: *** meals/day. ***  BK: ***  LN: ***  DN: ***  Snacks: ***  Drinks: ***    Physical Activity: no    Diabetes Management  Current Medications: metformin 1000 mg BID and pioglitazone 15 mg daily ( just started 2 weeks ago)  Previous Medications: Ozempic - GI bleed     Adherence: Takes medication as directed and reports no missed doses  Adverse Effects: none    Risk Reducing Meds  On ACEi/ARB: Yes   On SGLT2i: No   On GLP1-RA: No   On Statin: Yes     Glucose Monitoring  Glucometer/CGM Type: One Touch ultra    Current home BG readings: 160 fasting     Hypoglycemia: Patient denies signs and symptoms  Hyperglycemia: {Hyperglycemia:17445}    Diabetic Eye Exam: Up to date, completed ***  Monofilament Foot Exam: Up to date, completed ***      Secondary Prevention  ASCVD Risk:   The 10-year ASCVD risk score (Samantha PURDY, et al., 2019) is: 41.3%    Values used to calculate the score:      Age: 73 years      Sex: Male      Is Non- : No      Diabetic: Yes      Tobacco smoker: No      Systolic Blood Pressure: 130 mmHg      Is BP treated: Yes      HDL Cholesterol: 53.8 mg/dL      Total Cholesterol: 146 mg/dL  On Statin?: Yes, pravastain 20 mg    BP Readings from Last 3 Encounters:   01/05/24 130/78   10/03/23 130/74   08/24/23 122/76     Current HTN Regimen: amlodipine 10 mg, losartan HCTZ  HTN at goal? Yes    Immunizations Needed: Tdap  Tobacco Use: non-smoker      Objective   Vitals  BP Readings  "from Last 2 Encounters:   01/05/24 130/78   10/03/23 130/74     BMI Readings from Last 1 Encounters:   01/05/24 36.04 kg/m²      Labs  A1C  Lab Results   Component Value Date    HGBA1C 8.4 (A) 01/05/2024    HGBA1C 6.8 (H) 10/03/2023    HGBA1C 6.9 (A) 10/03/2023     BMP  Lab Results   Component Value Date    CALCIUM 9.0 01/05/2024     01/05/2024    K 3.6 01/05/2024    CO2 29 01/05/2024     01/05/2024    BUN 16 01/05/2024    CREATININE 1.00 01/05/2024    GFRF CANCELED 08/21/2023    GFRMALE CANCELED 08/21/2023     LFTs  Lab Results   Component Value Date    ALT 37 01/05/2024    AST 21 01/05/2024    ALKPHOS 60 01/05/2024    BILITOT 0.6 01/05/2024     FLP  Lab Results   Component Value Date    TRIG 119 06/26/2023    CHOL 146 06/26/2023    LDLF 68 06/26/2023    HDL 53.8 06/26/2023     Urine Microalbumin  Lab Results   Component Value Date    MICROALBCREA 20.2 10/03/2023     Vitamin B12  No results found for: \"[id number]\"      Assessment/Plan   Problem List Items Addressed This Visit    None      Diabetes: Uncontrolled with last A1c 8.4% on 1/5/24. Current regimen includes metformin 1000 mg BID and pioglitazone 15 mg daily. Home BG readings ***. Due to ***, plan to ***.  Continue taking ***  Continue to monitor and record BG daily    Patient Education:  {Patient Education:12693}    Clinical Pharmacist follow-up: ***, {In-Person / Telehealth:85738} visit  Next PCP appointment: ***    Roseline Pineda, PharmD  Clinical Pharmacist  01/22/24    Continue all meds under the continuation of care with the referring provider and clinical pharmacy team.  Verbal consent to manage patient's drug therapy was obtained from the patient. They were informed they may decline to participate or withdraw from participation in pharmacy services at any time.        "

## 2024-01-22 NOTE — PROGRESS NOTES
I reviewed the progress note and agree with the resident’s findings and plans as written. Case discussed with resident.    Ángela Sherman, PharmD  [phone]  01/22/24

## 2024-01-22 NOTE — PATIENT INSTRUCTIONS
Hi!    It was very nice talking with you today. No follow up scheduled with pharmacy as you have deferred following with us. If at any point you would like to have an appointment with us, please reach out.     Below is a brief overview of what we talked about at today's appointment.  Continue taking metformin 1000 mg twice daily and pioglitazone 15 mg every day.   Start testing blood sugars more regularly and recording (3 times weekly)    If you ever have any questions or concerns, feel free to reach out.    Roseline Pineda, PharmD  [phone]

## 2024-01-22 NOTE — PROGRESS NOTES
[facility] Pharmacist Visit - Diabetes Management  Referring Provider: No Reynolds MD    Subjective   Gavin Hawkins Jr. is a 73 y.o. male who presents for a new diabetes management referral.     HPI  PMH significant for T2DM, HLD, HTN, neuropathy, BPH, and myasthenia gravis.  Patient has no known history of medullary thyroid cancer, pancreatitis, or complicated UTI.  Diagnosed 2005. Known DM complication of  neuropathy .  No special needs/barriers to therapy identified at this visit.     Diabetes Management  Current Medications: pioglitazone 15 mg just started 1/5/24, metformin 1000 mg BID  Previous Medications: Ozempic (resulted in significant GI ADR)    Adherence: Takes medication as directed and reports no missed doses.  Adverse Effects: None reported at this time.     Risk Reducing Meds  On ACEi/ARB: Yes   On SGLT2i: No   On GLP1-RA: No   On Statin: Yes     Glucose Monitoring  Glucometer: One Touch Ultra Blue    Current home BG readings: 160 FBG this week. Patient reports not checking blood glucose daily as he should - checks once weekly.     Hypoglycemia: Patient denies signs and symptoms  Hyperglycemia: Denies signs and symptoms    Diabetic Eye Exam: Patient reports following with an ophthalmologist  Monofilament Foot Exam: Patient reports following with a podiatrist    Lifestyle  Patient has made an effort to reduce sugar use in coffee. Patient was using 1 tablespoon of sugar per cup of coffee. Often eats vegetarian meals, but often containing lots of pasta    Physical Activity: No regular activity reported.     Secondary Prevention  ASCVD Risk:   The 10-year ASCVD risk score (Samantha PURDY, et al., 2019) is: 41.3%    Values used to calculate the score:      Age: 73 years      Sex: Male      Is Non- : No      Diabetic: Yes      Tobacco smoker: No      Systolic Blood Pressure: 130 mmHg      Is BP treated: Yes      HDL Cholesterol: 53.8 mg/dL      Total Cholesterol: 146 mg/dL  On Statin?:  Yes, low-intensity pravastatin 20 mg . LDL well controlled, 68 on 6/26/23.    Immunizations Needed: Patient states all up to date  Tobacco Use: non-smoker    Objective   Vitals  BP Readings from Last 2 Encounters:   01/05/24 130/78   10/03/23 130/74     BMI Readings from Last 1 Encounters:   01/05/24 36.04 kg/m²      Labs  A1C  Lab Results   Component Value Date    HGBA1C 8.4 (A) 01/05/2024    HGBA1C 6.8 (H) 10/03/2023    HGBA1C 6.9 (A) 10/03/2023     BMP  Lab Results   Component Value Date    CALCIUM 9.0 01/05/2024     01/05/2024    K 3.6 01/05/2024    CO2 29 01/05/2024     01/05/2024    BUN 16 01/05/2024    CREATININE 1.00 01/05/2024    GFRF CANCELED 08/21/2023    GFRMALE CANCELED 08/21/2023     LFTs  Lab Results   Component Value Date    ALT 37 01/05/2024    AST 21 01/05/2024    ALKPHOS 60 01/05/2024    BILITOT 0.6 01/05/2024     FLP  Lab Results   Component Value Date    TRIG 119 06/26/2023    CHOL 146 06/26/2023    LDLF 68 06/26/2023    HDL 53.8 06/26/2023     Urine Microalbumin  Lab Results   Component Value Date    MICROALBCREA 20.2 10/03/2023       Assessment/Plan     Diabetes: Uncontrolled and worsening with last A1c 8.4% on 1/5/24 (previously 6.9 on 10/3/23). Current regimen includes metformin 1000 mg BID, pioglitazone 15 mg QD. Patient states adherence with no side effects to medications. Patient is not testing regularly. Recent FBG of 160. Denies any symptoms of high or low blood sugars. Patient's pioglitazone was a new start about 2 weeks ago. Would like to make no changes and assess in a few weeks. Patient denies following up with pharmacy at this time.  Continue taking metformin 1000 mg twice daily and pioglitazone 15 mg every day.   Patient is not interested in following with the clinical pharmacy department at this time. He would like to defer to his PCP to manage medications and A1c. Patient may be amenable to follow up if next A1c is not in range.   Start recording blood sugars more  regularly (3 times weekly)  Future Considerations: GLP1 - requires PA    Patient Education:  Patient educated on the reason for referral and about the role of the pharmacy department in managing diabetic medications.    Clinical Pharmacist follow-up not scheduled at this time.  Next PCP appointment: 4/11/24    Katarina Malin, Pharmacy Intern  01/22/24    Continue all meds under the continuation of care with the referring provider and clinical pharmacy team.  Verbal consent to manage patient's drug therapy was obtained from the patient. They were informed they may decline to participate or withdraw from participation in pharmacy services at any time.

## 2024-01-22 NOTE — ASSESSMENT & PLAN NOTE
Diabetes: Uncontrolled and worsening with last A1c 8.4% on 1/5/24 (previously 6.9 on 10/3/23). Current regimen includes metformin 1000 mg BID, pioglitazone 15 mg QD. Patient states adherence with no side effects to medications. Patient is not testing regularly. Recent FBG of 160. Denies any symptoms of high or low blood sugars. Patient's pioglitazone was a new start about 2 weeks ago. Would like to make no changes and assess in a few weeks. Patient denies following up with pharmacy at this time.  Continue taking metformin 1000 mg twice daily and pioglitazone 15 mg every day.   Patient is not interested in following with the clinical pharmacy department at this time. He would like to defer to his PCP to manage medications and A1c. Patient may be amenable to follow up if next A1c is not in range.   Future Considerations: GLP1 - requires PA

## 2024-02-21 ENCOUNTER — OFFICE VISIT (OUTPATIENT)
Dept: DERMATOLOGY | Facility: CLINIC | Age: 74
End: 2024-02-21
Payer: MEDICARE

## 2024-02-21 DIAGNOSIS — D22.9 MULTIPLE BENIGN NEVI: ICD-10-CM

## 2024-02-21 DIAGNOSIS — L73.8 SEBACEOUS HYPERPLASIA OF FACE: ICD-10-CM

## 2024-02-21 DIAGNOSIS — D18.01 HEMANGIOMA OF SKIN: ICD-10-CM

## 2024-02-21 DIAGNOSIS — Z85.820 PERSONAL HISTORY OF MALIGNANT MELANOMA OF SKIN: ICD-10-CM

## 2024-02-21 DIAGNOSIS — L57.0 ACTINIC KERATOSIS: Primary | ICD-10-CM

## 2024-02-21 DIAGNOSIS — L81.4 LENTIGO: ICD-10-CM

## 2024-02-21 DIAGNOSIS — Z85.828 PERSONAL HISTORY OF SQUAMOUS CELL CARCINOMA OF SKIN: ICD-10-CM

## 2024-02-21 DIAGNOSIS — Z85.828 PERSONAL HISTORY OF SKIN CANCER: ICD-10-CM

## 2024-02-21 DIAGNOSIS — L82.1 SEBORRHEIC KERATOSIS: ICD-10-CM

## 2024-02-21 PROCEDURE — 17000 DESTRUCT PREMALG LESION: CPT | Performed by: STUDENT IN AN ORGANIZED HEALTH CARE EDUCATION/TRAINING PROGRAM

## 2024-02-21 PROCEDURE — 1125F AMNT PAIN NOTED PAIN PRSNT: CPT | Performed by: STUDENT IN AN ORGANIZED HEALTH CARE EDUCATION/TRAINING PROGRAM

## 2024-02-21 PROCEDURE — 99213 OFFICE O/P EST LOW 20 MIN: CPT | Performed by: STUDENT IN AN ORGANIZED HEALTH CARE EDUCATION/TRAINING PROGRAM

## 2024-02-21 PROCEDURE — 1157F ADVNC CARE PLAN IN RCRD: CPT | Performed by: STUDENT IN AN ORGANIZED HEALTH CARE EDUCATION/TRAINING PROGRAM

## 2024-02-21 PROCEDURE — 1159F MED LIST DOCD IN RCRD: CPT | Performed by: STUDENT IN AN ORGANIZED HEALTH CARE EDUCATION/TRAINING PROGRAM

## 2024-02-21 PROCEDURE — 1036F TOBACCO NON-USER: CPT | Performed by: STUDENT IN AN ORGANIZED HEALTH CARE EDUCATION/TRAINING PROGRAM

## 2024-02-21 PROCEDURE — 1160F RVW MEDS BY RX/DR IN RCRD: CPT | Performed by: STUDENT IN AN ORGANIZED HEALTH CARE EDUCATION/TRAINING PROGRAM

## 2024-02-21 PROCEDURE — 17003 DESTRUCT PREMALG LES 2-14: CPT | Performed by: STUDENT IN AN ORGANIZED HEALTH CARE EDUCATION/TRAINING PROGRAM

## 2024-02-21 PROCEDURE — 3008F BODY MASS INDEX DOCD: CPT | Performed by: STUDENT IN AN ORGANIZED HEALTH CARE EDUCATION/TRAINING PROGRAM

## 2024-02-21 NOTE — PATIENT INSTRUCTIONS

## 2024-02-21 NOTE — PROGRESS NOTES
Subjective   Gavin Hawkins Jr. is a 73 y.o. male who presents for the following: Skin Check (LV: 8/16/23 FBSE.) and Suspicious Skin Lesion (Left upper nose lesion, pad of glasses covers this area so he is unsure of how long it has been present.  Asymptomatic.)    Skin Cancer History  Moderately dysplastic nevus - left lower back s/p excision 3/14/23  SCC - right superior scalp s/p Mohs 9/21/21  Mildly dysplastic nevus - left posterior shoulder s/p excision 9/25/20  Melanoma - back s/p excision 2005  AK's    Family History of Skin Cancer  None      The following portions of the chart were reviewed this encounter and updated as appropriate:         Review of Systems: No other skin or systemic complaints.    Objective   Well appearing patient in no apparent distress; mood and affect are within normal limits.    A full examination was performed including scalp, head, eyes, ears, nose, lips, neck, chest, axillae, abdomen, back, buttocks, bilateral upper extremities, bilateral lower extremities, hands, feet, fingers, toes, fingernails, and toenails. All findings within normal limits unless otherwise noted below.    Well healed scar(s) at site(s) of prior treatment    Scattered cherry-red papule(s).    Scattered tan macules in sun-exposed areas.    Stuck on verrucous, tan-brown papules and plaques.      Scattered, uniform and benign-appearing, regular brown melanocytic papules and macules.    Generalized, Left Malar Cheek  Small yellow, lobulated papules with a central dell.    Mid Frontal Scalp (2), Mid Parietal Scalp (4), Right Anterior Neck  Erythematous macules with gritty scale.      Assessment/Plan       Scribe Attestation  By signing my name below, IDeirdre LPN , Scribe   attest that this documentation has been prepared under the direction and in the presence of Moe Barraza MD.     Actinic keratosis (7)  Mid Frontal Scalp (2); Mid Parietal Scalp (4); Right Anterior Neck    Discussed precancerous  nature of condition and relationship with sun-exposure.   Recommended treatment today with LN2. Discussed r/b of procedure including risk of pain, temporary redness, and dyspigmentation. Patient verbalized understanding and agreement with plan for treatment today.    Destr of lesion - Mid Frontal Scalp (2), Mid Parietal Scalp (4), Right Anterior Neck  Complexity: simple    Destruction method: cryotherapy    Informed consent: discussed and consent obtained    Lesion destroyed using liquid nitrogen: Yes    Cryotherapy cycles:  1  Outcome: patient tolerated procedure well with no complications    Post-procedure details: wound care instructions given      Personal history of malignant melanoma of skin    Personal history of squamous cell carcinoma of skin    Personal history of skin cancer    No evidence of recurrence at site(s) of prior treatment  Continue photoprotection with sun-protective clothing and sunscreen SPF 30+ daily  Continue routine self-skin examination  Continue to follow up every 6 months for routine FBSE or earlier prn for new/changing/concerning lesions       Hemangioma of skin    Reassured of benign nature of lesions    Lentigo    Reassured of benign nature of lesions    Seborrheic keratosis    Reassured of benign nature of lesions    Multiple benign nevi    Reassured of benign nature of lesions    Sebaceous hyperplasia of face (2)  Left Malar Cheek; Generalized    Reassured of benign nature of lesions

## 2024-04-05 ENCOUNTER — OFFICE VISIT (OUTPATIENT)
Dept: PRIMARY CARE | Facility: CLINIC | Age: 74
End: 2024-04-05
Payer: MEDICARE

## 2024-04-05 VITALS
SYSTOLIC BLOOD PRESSURE: 147 MMHG | TEMPERATURE: 98.1 F | HEART RATE: 74 BPM | OXYGEN SATURATION: 93 % | WEIGHT: 240 LBS | BODY MASS INDEX: 36.37 KG/M2 | HEIGHT: 68 IN | DIASTOLIC BLOOD PRESSURE: 79 MMHG | RESPIRATION RATE: 16 BRPM

## 2024-04-05 DIAGNOSIS — E66.01 CLASS 2 SEVERE OBESITY DUE TO EXCESS CALORIES WITH SERIOUS COMORBIDITY AND BODY MASS INDEX (BMI) OF 36.0 TO 36.9 IN ADULT (MULTI): ICD-10-CM

## 2024-04-05 DIAGNOSIS — I10 PRIMARY HYPERTENSION: ICD-10-CM

## 2024-04-05 DIAGNOSIS — E11.40 CONTROLLED TYPE 2 DIABETES MELLITUS WITH DIABETIC NEUROPATHY, WITHOUT LONG-TERM CURRENT USE OF INSULIN (MULTI): ICD-10-CM

## 2024-04-05 DIAGNOSIS — K92.2 UPPER GASTROINTESTINAL BLEED: ICD-10-CM

## 2024-04-05 DIAGNOSIS — E04.9 NODULAR GOITER: ICD-10-CM

## 2024-04-05 DIAGNOSIS — D70.9 NEUTROPENIA, UNSPECIFIED TYPE (CMS-HCC): ICD-10-CM

## 2024-04-05 DIAGNOSIS — K21.9 GASTROESOPHAGEAL REFLUX DISEASE WITHOUT ESOPHAGITIS: ICD-10-CM

## 2024-04-05 DIAGNOSIS — E08.43 DIABETIC AUTONOMIC NEUROPATHY ASSOCIATED WITH DIABETES MELLITUS DUE TO UNDERLYING CONDITION (MULTI): Primary | ICD-10-CM

## 2024-04-05 DIAGNOSIS — C43.59 MALIGNANT MELANOMA OF TORSO EXCLUDING BREAST (MULTI): ICD-10-CM

## 2024-04-05 DIAGNOSIS — G70.00 MYASTHENIA GRAVIS (MULTI): ICD-10-CM

## 2024-04-05 DIAGNOSIS — E78.2 MIXED HYPERLIPIDEMIA: ICD-10-CM

## 2024-04-05 PROBLEM — G56.20 ULNAR NEUROPATHY: Status: RESOLVED | Noted: 2023-02-09 | Resolved: 2024-04-05

## 2024-04-05 PROBLEM — R91.1 INCIDENTAL LUNG NODULE, > 3MM AND < 8MM: Status: ACTIVE | Noted: 2023-02-09

## 2024-04-05 LAB — POC HEMOGLOBIN A1C: 7.8 % (ref 4.2–6.5)

## 2024-04-05 PROCEDURE — 3077F SYST BP >= 140 MM HG: CPT | Performed by: INTERNAL MEDICINE

## 2024-04-05 PROCEDURE — 3008F BODY MASS INDEX DOCD: CPT | Performed by: INTERNAL MEDICINE

## 2024-04-05 PROCEDURE — 83036 HEMOGLOBIN GLYCOSYLATED A1C: CPT | Performed by: INTERNAL MEDICINE

## 2024-04-05 PROCEDURE — 1036F TOBACCO NON-USER: CPT | Performed by: INTERNAL MEDICINE

## 2024-04-05 PROCEDURE — 1157F ADVNC CARE PLAN IN RCRD: CPT | Performed by: INTERNAL MEDICINE

## 2024-04-05 PROCEDURE — 1160F RVW MEDS BY RX/DR IN RCRD: CPT | Performed by: INTERNAL MEDICINE

## 2024-04-05 PROCEDURE — 1159F MED LIST DOCD IN RCRD: CPT | Performed by: INTERNAL MEDICINE

## 2024-04-05 PROCEDURE — 99214 OFFICE O/P EST MOD 30 MIN: CPT | Performed by: INTERNAL MEDICINE

## 2024-04-05 PROCEDURE — 3078F DIAST BP <80 MM HG: CPT | Performed by: INTERNAL MEDICINE

## 2024-04-05 NOTE — ASSESSMENT & PLAN NOTE
7 mm right basilar nodule unchanged since CT chest 05/07/2018 and   considered benign. Mild bibasilar atelectasis

## 2024-04-05 NOTE — PROGRESS NOTES
"Subjective   Gavin Hawkins Jr. is a 73 y.o. male who presents for Follow-up and Diabetes.      Patient is here to Follow up on DM.  Last A1C was 8.4% on 01.05.24  Today A1C is 7.8%  Patient is transferring from Dr. Reynolds.    Has tried Ozempic but had a gastric bleed after his second pen, or 10 days into treatment.  2023        Review of Systems   All other systems reviewed and are negative.      Objective   /79 (BP Location: Right arm, Patient Position: Sitting)   Pulse 74   Temp 36.7 °C (98.1 °F)   Resp 16   Ht 1.727 m (5' 8\")   Wt 109 kg (240 lb)   SpO2 93%   BMI 36.49 kg/m²       Physical Exam  Vitals and nursing note reviewed.   Constitutional:       General: He is not in acute distress.     Appearance: He is not ill-appearing.   HENT:      Head: Normocephalic and atraumatic.      Mouth/Throat:      Mouth: Mucous membranes are moist.   Eyes:      Conjunctiva/sclera: Conjunctivae normal.   Cardiovascular:      Rate and Rhythm: Normal rate and regular rhythm.      Heart sounds: Normal heart sounds.   Pulmonary:      Effort: Pulmonary effort is normal.      Breath sounds: Normal breath sounds.   Skin:     General: Skin is warm.   Neurological:      Mental Status: He is alert.   Psychiatric:         Mood and Affect: Mood normal.         Thought Content: Thought content normal.         Judgment: Judgment normal.         Assessment/Plan   Problem List Items Addressed This Visit       Controlled diabetes mellitus with diabetic neuropathy (CMS/HCC)    Relevant Medications    SITagliptin phosphate (Januvia) 25 mg tablet    GERD (gastroesophageal reflux disease)    Hypertension    Hyperlipidemia    Melanoma (CMS/HCC)     Dr. Barraza at  Dermatology         Myasthenia gravis (CMS/HCC)    Nodular goiter     Thyroid nodule 1.2 cm unchanged.  Last imaged 2019         Relevant Orders    US thyroid    Class 2 severe obesity due to excess calories with serious comorbidity and body mass index (BMI) of 36.0 " to 36.9 in adult (CMS/[facility])    Upper gastrointestinal bleed    Relevant Orders    Referral to Gastroenterology    Neutropenia, unspecified type (CMS/[facility])     Other Visit Diagnoses       Diabetic autonomic neuropathy associated with diabetes mellitus due to underlying condition (CMS/[facility])    -  Primary    Relevant Orders    POCT glycosylated hemoglobin (Hb A1C) manually resulted (Completed)    Referral to Podiatry          Encounter Diagnoses   Name Primary?    Diabetic autonomic neuropathy associated with diabetes mellitus due to underlying condition (CMS/[facility]) Yes    Controlled type 2 diabetes mellitus with diabetic neuropathy, without long-term current use of insulin (CMS/[facility])     Primary hypertension     Mixed hyperlipidemia     Class 2 severe obesity due to excess calories with serious comorbidity and body mass index (BMI) of 36.0 to 36.9 in adult (CMS/[facility])     Gastroesophageal reflux disease without esophagitis     Myasthenia gravis (CMS/[facility])     Malignant melanoma of torso excluding breast (CMS/[facility])     Upper gastrointestinal bleed     Nodular goiter     Neutropenia, unspecified type (CMS/[facility])      Salvatore Madera, DO

## 2024-04-08 DIAGNOSIS — E78.5 HYPERLIPIDEMIA, UNSPECIFIED HYPERLIPIDEMIA TYPE: ICD-10-CM

## 2024-04-09 RX ORDER — PRAVASTATIN SODIUM 20 MG/1
TABLET ORAL
Qty: 90 TABLET | Refills: 0 | Status: SHIPPED | OUTPATIENT
Start: 2024-04-09

## 2024-04-11 ENCOUNTER — APPOINTMENT (OUTPATIENT)
Dept: PRIMARY CARE | Facility: CLINIC | Age: 74
End: 2024-04-11
Payer: MEDICARE

## 2024-04-15 ENCOUNTER — HOSPITAL ENCOUNTER (OUTPATIENT)
Dept: RADIOLOGY | Facility: CLINIC | Age: 74
Discharge: HOME | End: 2024-04-15
Payer: MEDICARE

## 2024-04-15 DIAGNOSIS — E04.9 NODULAR GOITER: ICD-10-CM

## 2024-04-15 PROCEDURE — 76536 US EXAM OF HEAD AND NECK: CPT

## 2024-04-15 PROCEDURE — 76536 US EXAM OF HEAD AND NECK: CPT | Performed by: RADIOLOGY

## 2024-05-13 NOTE — PROGRESS NOTES
Patient: Gavin Hawkins Jr.  : 1950 AGE: 74 y.o. SEX:male   MRN: [id number]   Provider: ALVIN Cameron     Location [facility]   Service Date: 2024     PCP: Salvatore Madera DO   Referred by: No ref. provider found          [facility]  New Visit Note      HISTORY OF PRESENT ILLNESS     Gavin Hawkins Jr. is a 74 y.o. male with a h/o  Non-insulin-dependent diabetes mellitus, GERD, hyperlipidemia, hypertension, myasthenia gravis, nonmetastatic malignant melanoma, neuropathy, obesity, BPH, restless legs, sleep apnea  who presents to [facility] for a comprehensive sleep medicine evaluation.     24: NPV for TRUPTI management, needs CPAP supplies. Patient was diagnosed with TRUPTI about 20 years ago? And was started on CPAP since then. Last sleep study  showed severe TRUPTI. Currently on auto-CPAP 8-16  cm H2O with EPR/Flex 3 and Dreamwear nasal medium mask through charity: water. Patient has been using machine every night. Patient denies machine problems, mask leak, air hunger, aerophagia, dry mouth, skin irritation, and nasal congestion. Complains of mask leak. The following are patient's perceived benefits of PAP: no snoring on PAP, refreshing sleep, decreased daytime sleepiness and/or fatigue, and better quality of sleep. Most nights will take to get his daughter up for work around 4am and does not reapply mask. His My Air phone carlos is not transmitting his CPAP usage data to his cell phone.     Visit 3/19/21 with Ange Nam:  GAVIN returns to clinic for follow-up today to review his sleep study results.   Gavin states that he has continue to use is older CPAP device at the same settings. He states that the settings have not been changed since he started therapy. He mentions that his urge/ nocturia has improved with medication adjustment from Urology. He states he does wake unrefreshed and has  EDS/ with naps during the day.     He is concerned that the pressure from CPAP is not effective d/t he states he does feel SOB at intervals. He endorses intermittent nasal congestion.   He would like to move forward with a new device with [facility].     Received results of sleep study dated 3/8/2021 with  [facility] showing severe sleep apnea with an AHI of 47.0 and SpO2 bigg of 70.9%. Recommendation is to consider APAP 4-20 cwp followed by machine download.         Media Information        SLEEP-WAKE SCHEDULE    Sleep Patterns: In terms of the patient's sleep/wake cycle, he generally gets into bed at approximately 9 PM.  his latency to sleep onset after lights out is 15 min. During the night, the patient generally awakens 2 times nightly. These awakenings are brief in duration. Final wake time on weekend mornings is around 4 AM. Naps daily around 1pm for 1-2 hours which is refreshing.     Compared to weekdays (work week), the patient's sleep schedule is  similar on the weekends, Final wake time on weekend mornings is around 8:30 AM.    Breathing during sleep: snoring and witnessed apneas  Behaviors at night: No   Sleep paralysis: No   Hypnogogic or hypnopompic hallucinations: No   Cataplexy: No     Leg symptoms and timing:  - Sensations: Patient does not have unusual sensations in their extremities that cause an urge to move them     Daytime Symptoms:  On awakening patient reports: waking refreshed  Patient denies daytime symptoms including: Denies: excessive daytime sleepiness feeling sleepy when driving    Sleep environment:  Preferred sleep position: back and side  Room is dark: Yes  Room is quiet: Yes  Room is cool: Yes  Bed comfort: good    SLEEP HABITS  Caffeine consumption: Yes, 4 cups/day  Alcohol consumption: Yes, rarely (occasional)  Smoking: No  Marijuana: No  Sleep aids: denies     WEIGHT: stable    ESS: 10  PROMISE: 8    REVIEW OF SYSTEMS     All other systems have been reviewed and are negative.    ALLERGIES      Allergies   Allergen Reactions    Aminoglycosides Other     Myasthenia Gravis    Chloroquine Other     Myasthenia Gravis    Ciprofloxacin Other     Myasthenia Gravis    Macrolide Antibiotics Other     Myasthenia Gravis    Magnesium Other     Myasthenia Gravis    Quinidine Other     Myasthenia Gravis    Tobramycin Other     Myasthenia Gravis       MEDICATIONS     Current Outpatient Medications   Medication Sig Dispense Refill    amLODIPine (Norvasc) 10 mg tablet Take 1 tablet (10 mg) by mouth once daily. for blood pressure 90 tablet 1    ascorbic acid, vitamin C, 500 mg capsule Take by mouth.      ketoconazole (NIZOral) 2 % shampoo APPLY 1 APPLICATION TOPICALLY AS BODY WASH 2-3 TIMES A WEEK TO AFFECTED AREA ON CHEST UNTIL CLEAR. MAY REPEAT AS NEEDED FOR FLARES.      losartan-hydrochlorothiazide (Hyzaar) 100-25 mg tablet Take 1 tablet by mouth once daily. 90 tablet 1    metFORMIN (Glucophage) 1,000 mg tablet Take 1 tablet (1,000 mg) by mouth 2 times a day with meals. 180 tablet 1    omeprazole (PriLOSEC) 40 mg DR capsule Take 1 capsule (40 mg) by mouth once daily in the morning. Take before meals. Do not crush or chew. 90 capsule 3    ONETOUCH DELICA LANCETS MISC Fine; Use 1 to check glucose once daily      OneTouch Ultra Test strip USE 1 STRIP TO CHECK GLUCOSE ONCE DAILY      pioglitazone (Actos) 15 mg tablet Take 1 tablet (15 mg) by mouth once daily. 90 tablet 1    pravastatin (Pravachol) 20 mg tablet TAKE 1 TABLET BY MOUTH ONCE DAILY AS DIRECTED 90 tablet 0    pyridostigmine (Mestinon) 60 mg tablet Take 1 tablet (60 mg) by mouth. 4 times daily      semaglutide (Ozempic) 0.25 mg or 0.5 mg (2 mg/3 mL) pen injector Inject 0.25 mg under the skin 1 (one) time per week for 30 days, THEN 0.5 mg 1 (one) time per week. 3 mL 3    SITagliptin phosphate (Januvia) 25 mg tablet Take 1 tablet (25 mg) by mouth once daily. 30 tablet 11    vit A,C and E-lutein-minerals (Ocuvite with Lutein) 300 mcg-200 mg-27 mg-2 mg tablet Take  "by mouth.      vitamin B complex tablet Super B complex tabs       No current facility-administered medications for this visit.       PAST HISTORIES     PERTINENT PAST MEDICAL HISTORY: See HPI    PERTINENT PAST SURGICAL HISTORY for Sleep Medicine:  non-contributory    PERTINENT FAMILY HISTORY for Sleep Medicine:  Patient denies family history of any sleep disorder.    PERTINENT SOCIAL HISTORY:  He  reports that he has never smoked. He has never used smokeless tobacco. He reports current alcohol use. He reports that he does not use drugs. He currently lives with spouse and retired from work.     Active Problems, Allergy List, Medication List, and PMH/PSH/FH/Social Hx have been reviewed and reconciled in chart. No significant changes unless documented in the pertinent chart section. Updates made when necessary.     PHYSICAL EXAM     VITAL SIGNS: /78   Pulse 89   Resp 18   Ht 1.803 m (5' 11\")   Wt 110 kg (242 lb 12.8 oz)   SpO2 95%   BMI 33.86 kg/m²     CURRENT WEIGHT:   Vitals:    05/20/24 0927   Weight: 110 kg (242 lb 12.8 oz)      PREVIOUS WEIGHTS:  Wt Readings from Last 3 Encounters:   05/20/24 110 kg (242 lb 12.8 oz)   05/17/24 108 kg (239 lb)   04/05/24 109 kg (240 lb)       Physical Exam  Constitutional: Awake, not in distress  Lungs: Clear to auscultation bilateral, no cough noted  Heart: Regular rate and rhythm  Skin: Warm, no rash  Neuro: No tremors, moves all extremities  Psych: alert and oriented to time, place, and person    HEENT:   Tonsils enlargement grade 1+   Airway comments: narrow lateral walls   Tongue scalloping: slight   Modified Mallampati score - 3    RESULTS/DATA     Iron (ug/dL)   Date Value   10/03/2023 76   04/20/2022 76     % Saturation (%)   Date Value   10/03/2023 20 (L)     Iron Saturation (%)   Date Value   04/20/2022 23 (L)     TIBC (ug/dL)   Date Value   10/03/2023 389   04/20/2022 333     Ferritin   Date Value   10/03/2023 46 ng/mL   04/20/2022 58 ug/L       Bicarbonate "   Date Value Ref Range Status   01/05/2024 29 21 - 32 mmol/L Final       ASSESSMENT/PLAN     Mr. Hawkins is a 74 y.o. male and He was referred to the [facility] for evaluation of TRUPTI    Problem List, Orders, Assessment, Recommendations:    OBSTRUCTIVE SLEEP APNEA- severe  Sleep study 3/8/20[facility] at [facility] showing severe sleep apnea with an AHI of 47.0 and SpO2 bigg of 70.9%  - CPAP set up 3/30/21    - Retrieved and personally reviewed recent PAP adherence download data today. See HPI.  - good compliance to PAP therapy, residual AHI at goal, and good control of TRUPTI symptoms---> encouraged to increase duration of use  - continue current setting 8-16 CWP  - renew PAP supply orders, order placed to DME- [facility]  - Patient fit with N30i medium, sample mask provided in office today   - diet, exercise, and weight loss were emphasized today in clinic, as were non-supine sleep, avoiding alcohol in the late evening, and driving or operating heavy machinery when sleepy. Patient verbalized understanding.      CHRONIC SLEEP ONSET/ SLEEP MAINTENANCE INSOMNIA, likely due to poor sleep hygiene, irregular sleep schedule, depression, anxiety, untreated sleep apnea, nocturia, RLS. MOnitor     HYPERTENSION:  -Last Echo: EF 60% on Sept 2019  -Follows with PCP     OBESITY with a BMI of 33. His most recent Bicarb on BMP was 30 on Jan, 2021  Options discussed     MYASTHENIA GRAVIS:  continue with Pyridostigmine  follows Endocrine, PCP and Neurology    All of patient's questions were answered. He verbalizes understanding and agreement with my assessment and plan.    Disposition    Return to clinic in 12 months

## 2024-05-16 PROBLEM — G47.33 OSA (OBSTRUCTIVE SLEEP APNEA): Status: ACTIVE | Noted: 2024-05-16

## 2024-05-17 ENCOUNTER — OFFICE VISIT (OUTPATIENT)
Dept: PRIMARY CARE | Facility: CLINIC | Age: 74
End: 2024-05-17
Payer: MEDICARE

## 2024-05-17 VITALS
SYSTOLIC BLOOD PRESSURE: 120 MMHG | RESPIRATION RATE: 16 BRPM | HEART RATE: 76 BPM | BODY MASS INDEX: 36.22 KG/M2 | DIASTOLIC BLOOD PRESSURE: 70 MMHG | HEIGHT: 68 IN | TEMPERATURE: 98 F | WEIGHT: 239 LBS | OXYGEN SATURATION: 97 %

## 2024-05-17 DIAGNOSIS — K92.2 UPPER GASTROINTESTINAL BLEED: ICD-10-CM

## 2024-05-17 DIAGNOSIS — E11.40 CONTROLLED TYPE 2 DIABETES MELLITUS WITH DIABETIC NEUROPATHY, WITHOUT LONG-TERM CURRENT USE OF INSULIN (MULTI): ICD-10-CM

## 2024-05-17 PROCEDURE — 1126F AMNT PAIN NOTED NONE PRSNT: CPT | Performed by: INTERNAL MEDICINE

## 2024-05-17 PROCEDURE — 1160F RVW MEDS BY RX/DR IN RCRD: CPT | Performed by: INTERNAL MEDICINE

## 2024-05-17 PROCEDURE — 3074F SYST BP LT 130 MM HG: CPT | Performed by: INTERNAL MEDICINE

## 2024-05-17 PROCEDURE — 3078F DIAST BP <80 MM HG: CPT | Performed by: INTERNAL MEDICINE

## 2024-05-17 PROCEDURE — 1157F ADVNC CARE PLAN IN RCRD: CPT | Performed by: INTERNAL MEDICINE

## 2024-05-17 PROCEDURE — 1159F MED LIST DOCD IN RCRD: CPT | Performed by: INTERNAL MEDICINE

## 2024-05-17 PROCEDURE — 99212 OFFICE O/P EST SF 10 MIN: CPT | Performed by: INTERNAL MEDICINE

## 2024-05-17 RX ORDER — SEMAGLUTIDE 0.68 MG/ML
INJECTION, SOLUTION SUBCUTANEOUS
Qty: 3 ML | Refills: 3 | Status: SHIPPED | OUTPATIENT
Start: 2024-05-19 | End: 2024-08-17

## 2024-05-17 RX ORDER — OMEPRAZOLE 40 MG/1
40 CAPSULE, DELAYED RELEASE ORAL
Qty: 90 CAPSULE | Refills: 3 | Status: SHIPPED | OUTPATIENT
Start: 2024-05-17 | End: 2025-05-17

## 2024-05-17 ASSESSMENT — PAIN SCALES - GENERAL: PAINLEVEL: 0-NO PAIN

## 2024-05-17 NOTE — PROGRESS NOTES
"Subjective   Gavin Hawkins Jr. is a 74 y.o. male who presents for Follow-up (US thyroid ).      Patient has no issues or concerns today other then his test results       Review of Systems   All other systems reviewed and are negative.      Objective   /70   Pulse 76   Temp 36.7 °C (98 °F)   Resp 16   Ht 1.727 m (5' 8\")   Wt 108 kg (239 lb)   SpO2 97%   BMI 36.34 kg/m²       Physical Exam  Vitals and nursing note reviewed.   Constitutional:       General: He is not in acute distress.     Appearance: He is not ill-appearing.   HENT:      Head: Normocephalic and atraumatic.      Mouth/Throat:      Mouth: Mucous membranes are moist.   Eyes:      Conjunctiva/sclera: Conjunctivae normal.   Cardiovascular:      Rate and Rhythm: Normal rate and regular rhythm.      Heart sounds: Normal heart sounds.   Pulmonary:      Effort: Pulmonary effort is normal.      Breath sounds: Normal breath sounds.   Skin:     General: Skin is warm.   Neurological:      Mental Status: He is alert.   Psychiatric:         Mood and Affect: Mood normal.         Thought Content: Thought content normal.         Judgment: Judgment normal.       IMPRESSION:  1. Nodular goiter.  2. No new suspicious nodules are seen.  3. Previously detected solid nodule the right thyroid is not  conspicuous on the current imaging.      Assessment/Plan   Problem List Items Addressed This Visit       Controlled diabetes mellitus with diabetic neuropathy (Multi)    Relevant Medications    semaglutide (Ozempic) 0.25 mg or 0.5 mg (2 mg/3 mL) pen injector (Start on 5/19/2024)    Upper gastrointestinal bleed    Relevant Medications    omeprazole (PriLOSEC) 40 mg DR capsule     Encounter Diagnoses   Name Primary?   • Upper gastrointestinal bleed    • Controlled type 2 diabetes mellitus with diabetic neuropathy, without long-term current use of insulin (Multi)      Salvatore Madera DO   "

## 2024-05-20 ENCOUNTER — OFFICE VISIT (OUTPATIENT)
Dept: SLEEP MEDICINE | Facility: CLINIC | Age: 74
End: 2024-05-20
Payer: MEDICARE

## 2024-05-20 VITALS
HEIGHT: 71 IN | WEIGHT: 242.8 LBS | SYSTOLIC BLOOD PRESSURE: 132 MMHG | HEART RATE: 89 BPM | DIASTOLIC BLOOD PRESSURE: 78 MMHG | RESPIRATION RATE: 18 BRPM | OXYGEN SATURATION: 95 % | BODY MASS INDEX: 33.99 KG/M2

## 2024-05-20 DIAGNOSIS — G47.33 OSA (OBSTRUCTIVE SLEEP APNEA): Primary | ICD-10-CM

## 2024-05-20 DIAGNOSIS — E66.09 CLASS 1 OBESITY DUE TO EXCESS CALORIES WITH SERIOUS COMORBIDITY AND BODY MASS INDEX (BMI) OF 33.0 TO 33.9 IN ADULT: ICD-10-CM

## 2024-05-20 DIAGNOSIS — I10 PRIMARY HYPERTENSION: ICD-10-CM

## 2024-05-20 PROBLEM — E66.811 CLASS 1 OBESITY DUE TO EXCESS CALORIES WITH SERIOUS COMORBIDITY AND BODY MASS INDEX (BMI) OF 33.0 TO 33.9 IN ADULT: Status: ACTIVE | Noted: 2024-05-20

## 2024-05-20 PROCEDURE — 99214 OFFICE O/P EST MOD 30 MIN: CPT | Performed by: NURSE PRACTITIONER

## 2024-05-20 PROCEDURE — 1160F RVW MEDS BY RX/DR IN RCRD: CPT | Performed by: NURSE PRACTITIONER

## 2024-05-20 PROCEDURE — 3075F SYST BP GE 130 - 139MM HG: CPT | Performed by: NURSE PRACTITIONER

## 2024-05-20 PROCEDURE — 1036F TOBACCO NON-USER: CPT | Performed by: NURSE PRACTITIONER

## 2024-05-20 PROCEDURE — 3078F DIAST BP <80 MM HG: CPT | Performed by: NURSE PRACTITIONER

## 2024-05-20 PROCEDURE — 1159F MED LIST DOCD IN RCRD: CPT | Performed by: NURSE PRACTITIONER

## 2024-05-20 PROCEDURE — 1157F ADVNC CARE PLAN IN RCRD: CPT | Performed by: NURSE PRACTITIONER

## 2024-05-20 ASSESSMENT — SLEEP AND FATIGUE QUESTIONNAIRES
HOW LIKELY ARE YOU TO NOD OFF OR FALL ASLEEP WHILE SITTING AND READING: SLIGHT CHANCE OF DOZING
DIFFICULTY_STAYING_ASLEEP: MILD
WORRIED_DISTRESSED_DUE_TO_SLEEP: SOMEWHAT
HOW LIKELY ARE YOU TO NOD OFF OR FALL ASLEEP WHILE WATCHING TV: MODERATE CHANCE OF DOZING
HOW LIKELY ARE YOU TO NOD OFF OR FALL ASLEEP WHEN YOU ARE A PASSENGER IN A CAR FOR AN HOUR WITHOUT A BREAK: SLIGHT CHANCE OF DOZING
WAKING_TOO_EARLY: MODERATE
SATISFACTION_WITH_CURRENT_SLEEP_PATTERN: SATISFIED
HOW LIKELY ARE YOU TO NOD OFF OR FALL ASLEEP IN A CAR, WHILE STOPPED FOR A FEW MINUTES IN TRAFFIC: SLIGHT CHANCE OF DOZING
HOW LIKELY ARE YOU TO NOD OFF OR FALL ASLEEP WHILE SITTING AND TALKING TO SOMEONE: SLIGHT CHANCE OF DOZING
HOW LIKELY ARE YOU TO NOD OFF OR FALL ASLEEP WHILE LYING DOWN TO REST IN THE AFTERNOON WHEN CIRCUMSTANCES PERMIT: HIGH CHANCE OF DOZING
HOW LIKELY ARE YOU TO NOD OFF OR FALL ASLEEP WHILE SITTING QUIETLY AFTER LUNCH WITHOUT ALCOHOL: SLIGHT CHANCE OF DOZING
ESS-CHAD TOTAL SCORE: 10
SLEEP_PROBLEM_INTERFERES_DAILY_ACTIVITIES: NOT AT ALL NOTICEABLE
SLEEP_PROBLEM_NOTICEABLE_TO_OTHERS: SOMEWHAT
SITING INACTIVE IN A PUBLIC PLACE LIKE A CLASS ROOM OR A MOVIE THEATER: WOULD NEVER DOZE

## 2024-05-20 ASSESSMENT — PATIENT HEALTH QUESTIONNAIRE - PHQ9
SUM OF ALL RESPONSES TO PHQ9 QUESTIONS 1 AND 2: 0
1. LITTLE INTEREST OR PLEASURE IN DOING THINGS: NOT AT ALL
2. FEELING DOWN, DEPRESSED OR HOPELESS: NOT AT ALL

## 2024-05-20 ASSESSMENT — ENCOUNTER SYMPTOMS
DEPRESSION: 0
LOSS OF SENSATION IN FEET: 1
OCCASIONAL FEELINGS OF UNSTEADINESS: 0

## 2024-05-20 ASSESSMENT — COLUMBIA-SUICIDE SEVERITY RATING SCALE - C-SSRS
1. IN THE PAST MONTH, HAVE YOU WISHED YOU WERE DEAD OR WISHED YOU COULD GO TO SLEEP AND NOT WAKE UP?: NO
6. HAVE YOU EVER DONE ANYTHING, STARTED TO DO ANYTHING, OR PREPARED TO DO ANYTHING TO END YOUR LIFE?: NO
2. HAVE YOU ACTUALLY HAD ANY THOUGHTS OF KILLING YOURSELF?: NO

## 2024-05-20 NOTE — PATIENT INSTRUCTIONS
[facility] Sleep Medicine  DO [address] [place] [address] [address] [place] [address]       NAME: Gavin Hawkins   DATE: 05/20/24    Your Sleep Provider Today: ALVIN Cameron  Your Primary Care Physician: Salvatore Madera DO       DIAGNOSIS:   1. TRUPTI (obstructive sleep apnea)            Thank you for coming to the Sleep Medicine Clinic today! Your sleep medicine provider today was: ALVIN Cameron Below is a summary of your treatment plan, other important information, and our contact numbers:      TREATMENT PLAN     - Follow-up in 12 months.  - If not already done, sign up for 'My Chart' and send prescription requests or messages through this    Annual Reminders About Your Sleep Apnea    Your sleep apnea is well controlled based on reviewing your PAP Data Report. A supply renewal prescription has been sent to your DME company.     General Reminders:  Continue current machine settings. Continue using machine every night. Need at least 4 hours daily usage.   Remember to clean your mask, tubings, and water chamber regularly as instructed.  Know the replacement schedule of your supplies/ accessories and contact your DME (durable medical equipment) provider if you are due for them.  Avoid driving or operating heavy machinery when drowsy. A person driving while sleepy is 5 times more likely to have an accident. If you feel sleepy, pull over and take a short power nap (sleep for less than 30 minutes). Otherwise, ask somebody to drive you.    Follow-up sooner through [employer] or calling our office if you have any of the following symptoms:  Snoring or stopping breathing while using the machine  Recurrence of fatigue, sleepiness, insomnia, and other symptoms you had prior using machine  Persistent or worsening fatigue or sleepiness despite regular use of machine  Issues tolerating the machine like bloating sensation, air hunger, too much hot air,  too much pressure, taking off mask without recall in the middle of sleep, etc.     Other conservative measures to improve sleep apnea:  Losing weight can lead to some improvement of TRUPTI which means you will need lower pressures in machine to control your TRUPTI. In some patients, they don't need to use the machine after bariatric surgery. Hence, consider medical and/or surgical weight loss especially if your BMI is more than 35.  Avoiding alcohol, sedative-hypnotics, and sedating medications is imperative as these substances can worsen snoring and sleep apnea  If you have nasal congestion or seasonal allergies, improving your breathing through the nose is critical for treating sleep apnea, tolerating CPAP, and improving your sleep; hence, using intranasal steroid spray like Flonase might help. Make sure you know the proper way to use it.  Stay off your back when sleeping.    Common issues with PAP machine:  Mask gets dislodged when turning to the side: Consider getting a CPAP pillow or switching to a mask with hose on top.     Dry mouth:  Your machine has built-in humidifier that heats up the air to prevent dry mouth. It can be adjusted to your comfort. You can try that first and increase setting one level one night at a time to check which setting is comfortable and effective in lessening dry mouth. If dry mouth persists despite humidity setting adjustment, may apply OTC Biotene gel over the gums at bedtime.  If Biotene gel is not effective, consider trying XEROSTOM gel from [employer].  Also, eliminate or reduce dose of meds that can cause dry mouth if possible. Lastly, may try getting a separate room humidifier machine.    Airleaks: Please call DME as they may need to adjust your mask or refit you with a different kind of mask. In addition, you can ask DME for tips on getting a good mask seal and mask fit.     Difficulty tolerating the mask: Contact your DME to try a different kind of mask and/or call office to get  "a referral to Sleep Psychologist for CPAP desensitization. CPAP desensitization technique is a set of strategies that helps patient cope with claustrophobia and anxiety related to wearing mask. Alternatively, we can do a daytime mini-sleep study called PAP-nap trial wherein you will try on different kinds of mask and the sleep technician will try different pressure settings on CPAP and BPAP machines to see which specific pressure is tolerable and comfortable for you.     Water droplets or moisture within the hose and/or mask: This is called rain-out and it is caused by condensation of too much heated humidity on the cooler walls of the hose. If you have rain-out, turn down humidity settings or get a heated hose. If you already have a heated hose, turn up the \"tube temperature\" of the heated hose. Alternatively, if you don't want to get a heated hose or warmer air, may wrap the CPAP hose with stockings to keep it somewhat warm. Also, you need to place the machine on the floor and lower the hose so that water won't travel upward towards your mask.       Maintaining your CPAP/BPAP device:    The humidification chamber (aka water tank or water chamber) needs to be filled with distilled water to prevent buildup of white deposits in the future. If you cannot find distilled water, you can use tap water but expect to have white deposits buildup seen after prolonged use with tap water. If you start seeing white deposits on the water chamber, you can clean it by filling it with equal parts of distilled white vinegar and water. Let the vinegar-water mixture sit for 2 hours, and then rinse it with running tap water. Clean with soap and water then let it dry.     You should try to keep your machine clean in order to work well. Here are some tips to clean PAP supplies / accessories:    Clean the humidification chamber (aka water tank) as well as your mask and tubing at least once a week with soap and water.   Alternatively, you " can fill a sink or basin with warm water and add a little mild detergent, like Ivory dish soap. Gently wipe your supplies with the soapy water to free all the oils and dirt that may have collected. Once that's done, rinse these items with clean water until the soap is gone and let them air dry. You can hang your tubing over the curtain tootie in your bathroom so that it dries.  The mask insert (part of the mask that has contact with your skin) needs to be cleaned with soap and water daily. Another option is to wipe them down with CPAP wipes or baby wipes.    You should replace your mask and tubing frequently in order to prevent bacteria buildup, machine damage, and mask seal issues. The older the mask and hose, the high likelihood that there is bacteria buildup in it especially if they are not cleaned regularly. Dirty filters damage machines because build-up of dust and contaminants can cause machine to over-heat, and in time, damage the motor of machine. Cushions lose their seal over time as most masks are made of plastic and silicone while headgear is made of neoprene. These materials will break down with age and frequent use. Here is the recommended replacement schedule for PAP supplies / accessories:    Twice a month- disposable filters and cushions for nasal mask or nasal pillows.  Once a month- cushion for full face mask  Every 3 months- mask with headgear and PAP tubing (standard or heated hose)  Every 6 months- reusable filter, water chamber, and chin strap     Other useful information:    Some people do not put water in the tank while other people prefers to put water in the tank to prevent mouth dryness. Try to experiment to determine which is more comfortable for you.   In general, new machines have 2 years warranty on parts while health insurance allows you to have a new machine once every 5 years.      IMPORTANT INFORMATION     Call 911 for medical emergencies.  Our offices are generally open from  Monday-Friday, 9 am - 5 pm.  If you need to get in touch with me, you may either call me/my team (number is below) or you can use [employer].  If a referral for a test, for CPAP, or for another specialist was made, and you have not heard about scheduling this within a week, please call scheduling at [phone]).  If you are unable to make your appointment for clinic or an overnight study, kindly call the office at least 48 hours in advance to cancel and reschedule.  If you are on CPAP, please bring your device's card or the device to each clinic appointment.   There are no supporting services by either the sleep doctors or their staff on weekends and Holidays, or after 5 PM on weekdays.   If you have been asked to come to a sleep study, make sure you bring toiletries, a comfy pillow, and any nighttime medications that you may regularly take. Also be sure to eat dinner before you arrive. We generally do not provide meals.      PRESCRIPTIONS     We require 7 days advanced notice for prescription refills. If we do not receive the request in this time, we cannot guarantee that your medication will be refilled in time.      IMPORTANT PHONE NUMBERS       Appointments (for Adult Sleep Clinic): [phone] - option 2  Appointments (For Sleep Studies): [phone] - option 3  Behavioral Sleep Medicine: [phone]  Sleep Surgery: [phone]  ENT (Otolaryngology): [phone]  Headache Clinic (Neurology): [phone]  Neurology: [phone]  Psychiatry: [phone]  Pulmonary Function Testing (PFT) Center: [phone]  Pulmonary Medicine: [phone]  [facility] ([employer]): [phone]  wali ([employer]): [phone]  [facility] (DME): [phone]        CONTACTING YOUR SLEEP MEDICINE PROVIDER AND SLEEP TEAM      For issues with your machine or mask interface, please call your DME provider first. [employer] stands for durable medical company. [employer] is the company who provides  "you the machine and/or PAP supplies / accessories.   To schedule, cancel, or reschedule SLEEP STUDY APPOINTMENTS, please call the Main Phone Line at [phone] - option 3.   To schedule, cancel, or reschedule CLINIC APPOINTMENTS, you can do it in \"MyChart\", call [phone] for [facility] office , [phone] for [facility] office to speak with my on site staff, or call the Main Phone Line at [phone] - option 2  For CLINICAL QUESTIONS or MEDICATION REFILLS, please call direct line for Adult Sleep Nurses at [phone].   Lastly, you can also send a message directly to your provider through \"My Chart\", which is the email service through your  Records Account: [email or web address]     Adult Sleep Nurses (Barbie Austin, RN and Yasmin Greenberg RN):  For clinical questions and refilling prescriptions: [phone]  Email sleep diaries and other documents at: [email or web address]    Office locations for Michelle Bowen NP:    [address]  [place] [address] [phone]    [address]  [address]  [place] [address] [phone]      OUR SLEEP TESTING LOCATIONS     Our team will contact you to schedule your sleep study, however, you can contact us as follow:  Main Phone Line (scheduling only): [phone]), option 3    Sleep Testing Locations:   [place] (18 years and older): [address] [place] [address], 2nd floor   [place] (18 years and older): [address] [facility]; 4th floor  After hours line: [phone]  [facility] (18 years and older) at [place]: [id number] [place] [address]  After hours line: [phone]   [facility] at [facility] (Main campus: All ages): [facility], 6th floor. After hours line: [phone]   [place] (5 years and older; younger considered on case-by-case basis): [address]; [facility] [address], [address]. Scheduling  After hours line: [phone]   "     Here at [facility], we wish you a restful sleep!    Your sleep medicine provider for this visit was: Michelle Bowen, BHARAT-CNP

## 2024-05-24 DIAGNOSIS — E11.40 CONTROLLED TYPE 2 DIABETES MELLITUS WITH DIABETIC NEUROPATHY, WITHOUT LONG-TERM CURRENT USE OF INSULIN (MULTI): ICD-10-CM

## 2024-05-24 RX ORDER — METFORMIN HYDROCHLORIDE 1000 MG/1
1000 TABLET ORAL
Qty: 180 TABLET | Refills: 0 | Status: SHIPPED | OUTPATIENT
Start: 2024-05-24

## 2024-05-30 ENCOUNTER — OFFICE VISIT (OUTPATIENT)
Dept: PODIATRY | Facility: CLINIC | Age: 74
End: 2024-05-30
Payer: MEDICARE

## 2024-05-30 DIAGNOSIS — M79.674 PAIN IN TOES OF BOTH FEET: ICD-10-CM

## 2024-05-30 DIAGNOSIS — E11.40 CONTROLLED TYPE 2 DIABETES MELLITUS WITH DIABETIC NEUROPATHY, WITHOUT LONG-TERM CURRENT USE OF INSULIN (MULTI): Primary | ICD-10-CM

## 2024-05-30 DIAGNOSIS — M79.675 PAIN IN TOES OF BOTH FEET: ICD-10-CM

## 2024-05-30 DIAGNOSIS — B35.1 ONYCHOMYCOSIS: ICD-10-CM

## 2024-05-30 DIAGNOSIS — I87.2 VENOUS INSUFFICIENCY: ICD-10-CM

## 2024-05-30 PROCEDURE — 1160F RVW MEDS BY RX/DR IN RCRD: CPT | Performed by: PODIATRIST

## 2024-05-30 PROCEDURE — 1159F MED LIST DOCD IN RCRD: CPT | Performed by: PODIATRIST

## 2024-05-30 PROCEDURE — 1157F ADVNC CARE PLAN IN RCRD: CPT | Performed by: PODIATRIST

## 2024-05-30 PROCEDURE — 99203 OFFICE O/P NEW LOW 30 MIN: CPT | Performed by: PODIATRIST

## 2024-05-30 NOTE — PROGRESS NOTES
History Of Present Illness  Gavin Hawkins Jr. is a 74 y.o. male presenting for transition of care for diabetic nail care. Patient complains with painful elongated nails. Pt feels like his toes are too swollen for socks, he has some burning at bedtime   PCP Dr Madera  Last visit 4/5/24     Past Medical History  He has a past medical history of Muscle weakness (generalized) (05/21/2018), Personal history of diseases of the skin and subcutaneous tissue (02/01/2019), Personal history of other diseases of male genital organs, Personal history of other infectious and parasitic diseases (02/04/2019), and Radiculopathy, cervical region (10/30/2015).    Surgical History  He has a past surgical history that includes Other surgical history (04/28/2022) and Other surgical history (04/24/2018).     Social History  He reports that he has never smoked. He has never used smokeless tobacco. He reports current alcohol use. He reports that he does not use drugs.    Family History  Family History   Problem Relation Name Age of Onset    Cataracts Mother      Other (smoking) Mother      Diabetes Father      Other (cardiac disorder) Father          Allergies  Aminoglycosides, Chloroquine, Ciprofloxacin, Macrolide antibiotics, Magnesium, Quinidine, and Tobramycin    Medications  Current Outpatient Medications   Medication Sig Dispense Refill    amLODIPine (Norvasc) 10 mg tablet Take 1 tablet (10 mg) by mouth once daily. for blood pressure 90 tablet 1    ascorbic acid, vitamin C, 500 mg capsule Take by mouth.      ketoconazole (NIZOral) 2 % shampoo APPLY 1 APPLICATION TOPICALLY AS BODY WASH 2-3 TIMES A WEEK TO AFFECTED AREA ON CHEST UNTIL CLEAR. MAY REPEAT AS NEEDED FOR FLARES.      losartan-hydrochlorothiazide (Hyzaar) 100-25 mg tablet Take 1 tablet by mouth once daily. 90 tablet 1    metFORMIN (Glucophage) 1,000 mg tablet TAKE 1 TABLET BY MOUTH TWICE DAILY WITH MEALS 180 tablet 0    omeprazole (PriLOSEC) 40 mg DR capsule Take 1  capsule (40 mg) by mouth once daily in the morning. Take before meals. Do not crush or chew. 90 capsule 3    ONETOUCH DELICA LANCETS MISC Fine; Use 1 to check glucose once daily      OneTouch Ultra Test strip USE 1 STRIP TO CHECK GLUCOSE ONCE DAILY      pioglitazone (Actos) 15 mg tablet Take 1 tablet (15 mg) by mouth once daily. 90 tablet 1    pravastatin (Pravachol) 20 mg tablet TAKE 1 TABLET BY MOUTH ONCE DAILY AS DIRECTED 90 tablet 0    pyridostigmine (Mestinon) 60 mg tablet Take 1 tablet (60 mg) by mouth. 4 times daily      semaglutide (Ozempic) 0.25 mg or 0.5 mg (2 mg/3 mL) pen injector Inject 0.25 mg under the skin 1 (one) time per week for 30 days, THEN 0.5 mg 1 (one) time per week. 3 mL 3    SITagliptin phosphate (Januvia) 25 mg tablet Take 1 tablet (25 mg) by mouth once daily. 30 tablet 11    vit A,C and E-lutein-minerals (Ocuvite with Lutein) 300 mcg-200 mg-27 mg-2 mg tablet Take by mouth.      vitamin B complex tablet Super B complex tabs       No current facility-administered medications for this visit.       Review of Systems    REVIEW OF SYSTEMS  GENERAL:  Negative for malaise, significant weight loss, fever  CARDIOVASCULAR: leg swelling   MUSCULOSKELETAL:  Negative for joint pain or swelling, back pain, and muscle pain.  SKIN:  Negative for lesions, rash, and itching  PSYCH:  Negative for sleep disturbance, mood disorder and recent psychosocial stressors  NEURO:  burning, tingling or numbness     Objective:   Vasc: DP and PT pulses are palpable bilateral.  CFT is less than 3 seconds bilateral.  Skin temperature is warm to cool proximal to distal bilateral.  positive edema and varicosities    Neuro:  Light touch is intact to the foot bilateral.  Protective sensation is decreased to the foot when tested with the 5.07 SWM bilateral.  There is no clonus noted.  The hallux is downgoing bilateral.      Derm: Nails 1-5 bilateral are thickened, elongated and crumbly with subungual debris. Skin is supple with  normal texture and turgor noted.  Webspaces are clean, dry and intact bilateral.  There are no hyperkeratoses, ulcerations, verruca or other lesions noted.      Ortho: Muscle strength is 5/5 for all pedal groups tested.  Ankle joint, subtalar joint, 1st MPJ and lesser MPJ ROM is full and without pain or crepitus.  The foot type is rectus bilateral off weight bearing.  There are no structural deformities noted.    Assessment/Plan     Diagnoses and all orders for this visit:  Controlled type 2 diabetes mellitus with diabetic neuropathy, without long-term current use of insulin (Multi)  Onychomycosis  Pain in toes of both feet  Venous insufficiency      Toenails are debrided in length and thickness to avoid infection and for pain relief  Pt has both venous disease and neuropathy- we can first try  a 2 week period of compression hose. If this doesn't help, consider elan Aguilera, CMA

## 2024-06-10 ENCOUNTER — TELEPHONE (OUTPATIENT)
Dept: PODIATRY | Facility: CLINIC | Age: 74
End: 2024-06-10
Payer: MEDICARE

## 2024-06-10 DIAGNOSIS — E11.40 CONTROLLED TYPE 2 DIABETES MELLITUS WITH DIABETIC NEUROPATHY, WITHOUT LONG-TERM CURRENT USE OF INSULIN (MULTI): Primary | ICD-10-CM

## 2024-06-10 RX ORDER — GABAPENTIN 100 MG/1
100 CAPSULE ORAL 3 TIMES DAILY
Qty: 90 CAPSULE | Refills: 11 | Status: SHIPPED | OUTPATIENT
Start: 2024-06-10 | End: 2025-06-10

## 2024-06-10 NOTE — TELEPHONE ENCOUNTER
Gavin called regarding the medication that was denied.  He said he has not tried the other two that were mentioned

## 2024-06-27 DIAGNOSIS — I10 PRIMARY HYPERTENSION: ICD-10-CM

## 2024-06-27 DIAGNOSIS — E11.40 CONTROLLED TYPE 2 DIABETES MELLITUS WITH DIABETIC NEUROPATHY, WITHOUT LONG-TERM CURRENT USE OF INSULIN (MULTI): ICD-10-CM

## 2024-06-27 RX ORDER — AMLODIPINE BESYLATE 10 MG/1
10 TABLET ORAL DAILY
Qty: 90 TABLET | Refills: 3 | Status: SHIPPED | OUTPATIENT
Start: 2024-06-27

## 2024-06-27 RX ORDER — PIOGLITAZONEHYDROCHLORIDE 15 MG/1
15 TABLET ORAL DAILY
Qty: 90 TABLET | Refills: 3 | Status: SHIPPED | OUTPATIENT
Start: 2024-06-27

## 2024-07-03 DIAGNOSIS — E78.5 HYPERLIPIDEMIA, UNSPECIFIED HYPERLIPIDEMIA TYPE: ICD-10-CM

## 2024-07-05 RX ORDER — PRAVASTATIN SODIUM 20 MG/1
TABLET ORAL
Qty: 90 TABLET | Refills: 3 | Status: SHIPPED | OUTPATIENT
Start: 2024-07-05

## 2024-08-08 ENCOUNTER — APPOINTMENT (OUTPATIENT)
Dept: PODIATRY | Facility: CLINIC | Age: 74
End: 2024-08-08
Payer: MEDICARE

## 2024-08-08 DIAGNOSIS — M79.675 PAIN IN TOES OF BOTH FEET: ICD-10-CM

## 2024-08-08 DIAGNOSIS — E11.40 CONTROLLED TYPE 2 DIABETES MELLITUS WITH DIABETIC NEUROPATHY, WITHOUT LONG-TERM CURRENT USE OF INSULIN (MULTI): Primary | ICD-10-CM

## 2024-08-08 DIAGNOSIS — B35.1 ONYCHOMYCOSIS: ICD-10-CM

## 2024-08-08 DIAGNOSIS — M79.674 PAIN IN TOES OF BOTH FEET: ICD-10-CM

## 2024-08-08 DIAGNOSIS — G62.9 PERIPHERAL POLYNEUROPATHY: ICD-10-CM

## 2024-08-08 PROCEDURE — 1160F RVW MEDS BY RX/DR IN RCRD: CPT | Performed by: PODIATRIST

## 2024-08-08 PROCEDURE — 1157F ADVNC CARE PLAN IN RCRD: CPT | Performed by: PODIATRIST

## 2024-08-08 PROCEDURE — 99213 OFFICE O/P EST LOW 20 MIN: CPT | Performed by: PODIATRIST

## 2024-08-08 PROCEDURE — 1159F MED LIST DOCD IN RCRD: CPT | Performed by: PODIATRIST

## 2024-08-08 RX ORDER — DULOXETIN HYDROCHLORIDE 30 MG/1
30 CAPSULE, DELAYED RELEASE ORAL DAILY
Qty: 30 CAPSULE | Refills: 11 | Status: SHIPPED | OUTPATIENT
Start: 2024-08-08 | End: 2025-08-08

## 2024-08-08 NOTE — PROGRESS NOTES
History Of Present Illness  Gavin Hawkins Jr. is a 74 y.o. male presenting for diabetic nail care. Patient complains with painful elongated nails. Pt feels like his toes are too swollen for socks, he has some burning at bedtime, would like to discuss gabapentin.  Unfortunately with taking gabapentin he has had no change in his discomfort from his peripheral neuropathy.  He is willing to try another PCP Dr Madera  Last visit 5/17/24     Past Medical History  He has a past medical history of Muscle weakness (generalized) (05/21/2018), Personal history of diseases of the skin and subcutaneous tissue (02/01/2019), Personal history of other diseases of male genital organs, Personal history of other infectious and parasitic diseases (02/04/2019), and Radiculopathy, cervical region (10/30/2015).    Surgical History  He has a past surgical history that includes Other surgical history (04/28/2022) and Other surgical history (04/24/2018).     Social History  He reports that he has never smoked. He has never used smokeless tobacco. He reports current alcohol use. He reports that he does not use drugs.    Family History  Family History   Problem Relation Name Age of Onset    Cataracts Mother      Other (smoking) Mother      Diabetes Father      Other (cardiac disorder) Father          Allergies  Aminoglycosides, Chloroquine, Ciprofloxacin, Macrolide antibiotics, Magnesium, Quinidine, and Tobramycin    Medications  Current Outpatient Medications   Medication Sig Dispense Refill    amLODIPine (Norvasc) 10 mg tablet Take 1 tablet by mouth once daily for blood pressure 90 tablet 3    ascorbic acid, vitamin C, 500 mg capsule Take by mouth.      gabapentin (Neurontin) 100 mg capsule Take 1 capsule (100 mg) by mouth 3 times a day. 90 capsule 11    ketoconazole (NIZOral) 2 % shampoo APPLY 1 APPLICATION TOPICALLY AS BODY WASH 2-3 TIMES A WEEK TO AFFECTED AREA ON CHEST UNTIL CLEAR. MAY REPEAT AS NEEDED FOR FLARES.       losartan-hydrochlorothiazide (Hyzaar) 100-25 mg tablet Take 1 tablet by mouth once daily. 90 tablet 1    metFORMIN (Glucophage) 1,000 mg tablet TAKE 1 TABLET BY MOUTH TWICE DAILY WITH MEALS 180 tablet 0    omeprazole (PriLOSEC) 40 mg DR capsule Take 1 capsule (40 mg) by mouth once daily in the morning. Take before meals. Do not crush or chew. 90 capsule 3    ONETOUCH DELICA LANCETS MISC Fine; Use 1 to check glucose once daily      OneTouch Ultra Test strip USE 1 STRIP TO CHECK GLUCOSE ONCE DAILY      pioglitazone (Actos) 15 mg tablet Take 1 tablet by mouth once daily 90 tablet 3    pravastatin (Pravachol) 20 mg tablet TAKE 1 TABLET BY MOUTH ONCE DAILY AS DIRECTED 90 tablet 3    pyridostigmine (Mestinon) 60 mg tablet Take 1 tablet (60 mg) by mouth. 4 times daily      semaglutide (Ozempic) 0.25 mg or 0.5 mg (2 mg/3 mL) pen injector Inject 0.25 mg under the skin 1 (one) time per week for 30 days, THEN 0.5 mg 1 (one) time per week. 3 mL 3    SITagliptin phosphate (Januvia) 25 mg tablet Take 1 tablet (25 mg) by mouth once daily. 30 tablet 11    vit A,C and E-lutein-minerals (Ocuvite with Lutein) 300 mcg-200 mg-27 mg-2 mg tablet Take by mouth.      vitamin B complex tablet Super B complex tabs       No current facility-administered medications for this visit.       Review of Systems    REVIEW OF SYSTEMS  GENERAL:  Negative for malaise, significant weight loss, fever  CARDIOVASCULAR: leg swelling   MUSCULOSKELETAL:  Negative for joint pain or swelling, back pain, and muscle pain.  SKIN:  Negative for lesions, rash, and itching  PSYCH:  Negative for sleep disturbance, mood disorder and recent psychosocial stressors  NEURO:  burning, tingling or numbness     Objective:   Vasc: DP and PT pulses are palpable bilateral.  CFT is less than 3 seconds bilateral.  Skin temperature is warm to cool proximal to distal bilateral.  positive edema and varicosities    Neuro:  Light touch is intact to the foot bilateral.  Protective  sensation is decreased to the foot when tested with the 5.07 SWM bilateral.  There is no clonus noted.  The hallux is downgoing bilateral.      Derm: Nails 1-5 bilateral are thickened, elongated and crumbly with subungual debris. Skin is supple with normal texture and turgor noted.  Webspaces are clean, dry and intact bilateral.  There are no hyperkeratoses, ulcerations, verruca or other lesions noted.      Ortho: Muscle strength is 5/5 for all pedal groups tested.  Ankle joint, subtalar joint, 1st MPJ and lesser MPJ ROM is full and without pain or crepitus.  The foot type is rectus bilateral off weight bearing.  There are no structural deformities noted.    Assessment/Plan   Patient has painful diabetic neuropathy: Will wean off of gabapentin slowly.  Once weaned off patient can start duloxetine.    Patient has painful nail but mycosis  Toenails are debrided in length and thickness to avoid infection and for pain relief  Pt has both venous disease and neuropathy- we can first try  a 2 week period of compression hose.

## 2024-08-16 ENCOUNTER — APPOINTMENT (OUTPATIENT)
Dept: PRIMARY CARE | Facility: CLINIC | Age: 74
End: 2024-08-16
Payer: MEDICARE

## 2024-08-16 VITALS
TEMPERATURE: 98 F | OXYGEN SATURATION: 96 % | DIASTOLIC BLOOD PRESSURE: 62 MMHG | HEIGHT: 71 IN | RESPIRATION RATE: 16 BRPM | SYSTOLIC BLOOD PRESSURE: 116 MMHG | BODY MASS INDEX: 30.55 KG/M2 | WEIGHT: 218.2 LBS | HEART RATE: 78 BPM

## 2024-08-16 DIAGNOSIS — R91.1 INCIDENTAL LUNG NODULE, > 3MM AND < 8MM: ICD-10-CM

## 2024-08-16 DIAGNOSIS — G70.00 MYASTHENIA GRAVIS (MULTI): ICD-10-CM

## 2024-08-16 DIAGNOSIS — Z00.00 ROUTINE GENERAL MEDICAL EXAMINATION AT HEALTH CARE FACILITY: Primary | ICD-10-CM

## 2024-08-16 DIAGNOSIS — G47.33 OSA (OBSTRUCTIVE SLEEP APNEA): ICD-10-CM

## 2024-08-16 DIAGNOSIS — E11.40 CONTROLLED TYPE 2 DIABETES MELLITUS WITH DIABETIC NEUROPATHY, WITHOUT LONG-TERM CURRENT USE OF INSULIN (MULTI): ICD-10-CM

## 2024-08-16 DIAGNOSIS — K21.9 GASTROESOPHAGEAL REFLUX DISEASE WITHOUT ESOPHAGITIS: ICD-10-CM

## 2024-08-16 DIAGNOSIS — N40.0 BPH WITH ELEVATED PSA: ICD-10-CM

## 2024-08-16 DIAGNOSIS — E78.2 MIXED HYPERLIPIDEMIA: ICD-10-CM

## 2024-08-16 DIAGNOSIS — I10 PRIMARY HYPERTENSION: ICD-10-CM

## 2024-08-16 DIAGNOSIS — Z12.5 ENCOUNTER FOR PROSTATE CANCER SCREENING: ICD-10-CM

## 2024-08-16 DIAGNOSIS — R97.20 BPH WITH ELEVATED PSA: ICD-10-CM

## 2024-08-16 DIAGNOSIS — N52.9 MALE ERECTILE DISORDER: ICD-10-CM

## 2024-08-16 DIAGNOSIS — Z23 NEED FOR VACCINATION: ICD-10-CM

## 2024-08-16 PROBLEM — B02.29 POST HERPETIC NEURALGIA: Status: RESOLVED | Noted: 2023-02-09 | Resolved: 2024-08-16

## 2024-08-16 PROBLEM — E66.01 CLASS 2 SEVERE OBESITY DUE TO EXCESS CALORIES WITH SERIOUS COMORBIDITY AND BODY MASS INDEX (BMI) OF 36.0 TO 36.9 IN ADULT (MULTI): Status: RESOLVED | Noted: 2023-02-09 | Resolved: 2024-08-16

## 2024-08-16 PROBLEM — J45.909 AIRWAY HYPERREACTIVITY (HHS-HCC): Status: RESOLVED | Noted: 2023-02-09 | Resolved: 2024-08-16

## 2024-08-16 PROBLEM — E66.812 CLASS 2 SEVERE OBESITY DUE TO EXCESS CALORIES WITH SERIOUS COMORBIDITY AND BODY MASS INDEX (BMI) OF 36.0 TO 36.9 IN ADULT: Status: RESOLVED | Noted: 2023-02-09 | Resolved: 2024-08-16

## 2024-08-16 PROBLEM — G25.81 RESTLESS LEG: Status: RESOLVED | Noted: 2023-02-09 | Resolved: 2024-08-16

## 2024-08-16 LAB — POC HEMOGLOBIN A1C: 5.9 % (ref 4.2–6.5)

## 2024-08-16 ASSESSMENT — PATIENT HEALTH QUESTIONNAIRE - PHQ9
SUM OF ALL RESPONSES TO PHQ9 QUESTIONS 1 AND 2: 0
2. FEELING DOWN, DEPRESSED OR HOPELESS: NOT AT ALL
1. LITTLE INTEREST OR PLEASURE IN DOING THINGS: NOT AT ALL

## 2024-08-16 ASSESSMENT — ENCOUNTER SYMPTOMS
LOSS OF SENSATION IN FEET: 0
OCCASIONAL FEELINGS OF UNSTEADINESS: 0
DEPRESSION: 0

## 2024-08-16 ASSESSMENT — ACTIVITIES OF DAILY LIVING (ADL)
MANAGING_FINANCES: INDEPENDENT
DRESSING: INDEPENDENT
DOING_HOUSEWORK: INDEPENDENT
TAKING_MEDICATION: INDEPENDENT
BATHING: INDEPENDENT
GROCERY_SHOPPING: INDEPENDENT

## 2024-08-16 ASSESSMENT — PAIN SCALES - GENERAL: PAINLEVEL: 0-NO PAIN

## 2024-08-16 NOTE — ASSESSMENT & PLAN NOTE
Orders:    POCT glycosylated hemoglobin (Hb A1C) manually resulted    CBC; Future    Comprehensive Metabolic Panel; Future    Lipid Panel; Future    Magnesium; Future    Microscopic Only, Urine; Future    Albumin-Creatinine Ratio, Urine Random; Future  Well controlled with Diet and Exercise and is making a strong effort.  Eating healthy and working in the garden.

## 2024-08-16 NOTE — PROGRESS NOTES
"Subjective   Reason for Visit: Gavin Hawkins Jr. is an 74 y.o. male here for a Medicare Wellness visit.     Past Medical, Surgical, and Family History reviewed and updated in chart.    Reviewed all medications by prescribing practitioner or clinical pharmacist (such as prescriptions, OTCs, herbal therapies and supplements) and documented in the medical record.    Patient has living will and POA but not on file   Colonoscopy: 4.5.2023  PSA: 1.5.2024  A1C: 7.55   Patient is due for his pneumonia 20 and states that he gets his vaccines at his pharmacy   Patient states that he is getting his glucose at home daily, readings are between 100-115         Patient Care Team:  Salvatore Madera DO as PCP - General (Internal Medicine)  No Reynolds MD as PCP - [facility] Attributed Provider     Review of Systems   Constitutional:  Negative for chills and fever.   HENT:  Negative for sore throat and trouble swallowing.    Respiratory:  Negative for shortness of breath.    Cardiovascular:  Negative for chest pain, palpitations and leg swelling.   Gastrointestinal:  Negative for abdominal pain.   Skin:  Negative for rash.       Objective   Vitals:  /62   Pulse 78   Temp 36.7 °C (98 °F)   Resp 16   Ht 1.803 m (5' 11\")   Wt 99 kg (218 lb 3.2 oz)   SpO2 96%   BMI 30.43 kg/m²       Physical Exam  Constitutional:       Appearance: Normal appearance.   HENT:      Head: Normocephalic.   Eyes:      Conjunctiva/sclera: Conjunctivae normal.   Cardiovascular:      Rate and Rhythm: Normal rate and regular rhythm.      Heart sounds: Normal heart sounds.   Pulmonary:      Effort: Pulmonary effort is normal.      Breath sounds: Normal breath sounds.   Musculoskeletal:      Cervical back: Neck supple.   Skin:     General: Skin is warm and dry.   Neurological:      Mental Status: He is alert.       Assessment & Plan  Routine general medical examination at health care facility    Orders:    POCT glycosylated hemoglobin (Hb " A1C) manually resulted    Pneumococcal conjugate vaccine 20-valent IM    CT chest wo IV contrast; Future    CBC; Future    Comprehensive Metabolic Panel; Future    Lipid Panel; Future    Magnesium; Future    Microscopic Only, Urine; Future    Albumin-Creatinine Ratio, Urine Random; Future    Prostate Specific Antigen; Future    TSH with reflex to Free T4 if abnormal; Future    CT cardiac scoring wo IV contrast; Future    Controlled type 2 diabetes mellitus with diabetic neuropathy, without long-term current use of insulin (Multi)    Orders:    POCT glycosylated hemoglobin (Hb A1C) manually resulted    CBC; Future    Comprehensive Metabolic Panel; Future    Lipid Panel; Future    Magnesium; Future    Microscopic Only, Urine; Future    Albumin-Creatinine Ratio, Urine Random; Future  Well controlled with Diet and Exercise and is making a strong effort.  Eating healthy and working in the garden.    Primary hypertension  Well controlled        Mixed hyperlipidemia  Lipids Pending       BPH with elevated PSA  Following with Urology,        Gastroesophageal reflux disease without esophagitis  Stable with omeprazole.       Male erectile disorder  Urology managing       Myasthenia gravis (Multi)  Follows with Dr. Vann       Incidental lung nodule, > 3mm and < 8mm  CT ordered today for follow up.  Orders:    CT chest wo IV contrast; Future    TRUPTI (obstructive sleep apnea)  Using CPAP and uses it every night.       Encounter for prostate cancer screening    Orders:    Prostate Specific Antigen; Future    Need for vaccination    Orders:    Pneumococcal conjugate vaccine 20-valent IM           Advance Directives Discussion  16 - 20 minutes were spent discussing Advanced Care Planning (including a Living Will, Medical Power Of , as well as specific end of life choices and/or directives). The details of that discussion were documented in Advanced Directives Discussion section of the medical record.     Cardiac Risk  Assessment  15 - 20 minutes were spent discussing Cardiovascular risk and, if needed, lifestyle modifications were recommended, including nutritional choices, exercise, and elimination of habits contributing to risk.   Aspirin use/disuse was discussed following the guidelines below:  low dose ASA ( mg) should be considered:  He has had no prior Heart Attack/Stroke/Peripheral vascular disease and he is over 70: Do not use Aspirin for prevention    Reviewed and approved by JAMILAH MARLEY on 8/16/24 at 9:43 AM.

## 2024-08-22 DIAGNOSIS — E11.40 CONTROLLED TYPE 2 DIABETES MELLITUS WITH DIABETIC NEUROPATHY, WITHOUT LONG-TERM CURRENT USE OF INSULIN (MULTI): ICD-10-CM

## 2024-08-22 RX ORDER — PYRIDOSTIGMINE BROMIDE 60 MG/1
TABLET ORAL 4 TIMES DAILY
Qty: 360 TABLET | Refills: 0 | OUTPATIENT
Start: 2024-08-22

## 2024-08-22 RX ORDER — METFORMIN HYDROCHLORIDE 1000 MG/1
1000 TABLET ORAL
Qty: 180 TABLET | Refills: 3 | Status: SHIPPED | OUTPATIENT
Start: 2024-08-22

## 2024-08-23 ENCOUNTER — TELEPHONE (OUTPATIENT)
Dept: PRIMARY CARE | Facility: CLINIC | Age: 74
End: 2024-08-23
Payer: MEDICARE

## 2024-08-23 DIAGNOSIS — G70.00 MYASTHENIA GRAVIS (MULTI): Primary | ICD-10-CM

## 2024-08-23 RX ORDER — PYRIDOSTIGMINE BROMIDE 60 MG/1
60 TABLET ORAL 4 TIMES DAILY
Qty: 30 TABLET | Refills: 3 | Status: SHIPPED | OUTPATIENT
Start: 2024-08-23

## 2024-08-23 RX ORDER — PYRIDOSTIGMINE BROMIDE 60 MG/1
TABLET ORAL 4 TIMES DAILY
Qty: 360 TABLET | Refills: 0 | OUTPATIENT
Start: 2024-08-23

## 2024-08-23 ASSESSMENT — ENCOUNTER SYMPTOMS
ABDOMINAL PAIN: 0
PALPITATIONS: 0
CHILLS: 0
SHORTNESS OF BREATH: 0
FEVER: 0
TROUBLE SWALLOWING: 0
SORE THROAT: 0

## 2024-08-23 NOTE — TELEPHONE ENCOUNTER
Refill request for the following    pyridostigmine (Mestinon) 60 mg tablet     Patient stated he will be out before he has his appointment with neurology.    Please give at least a week's worth to get him to his appointment

## 2024-08-27 NOTE — PATIENT INSTRUCTIONS
"WHAT TO LOOK FOR: ABCDES OF MELANOMA:    A is for Asymmetry  One half of the spot is unlike the other half.    B is for Border  The spot has an irregular, scalloped, or poorly defined border.    C is for Color  The spot has varying colors from one area to the next, such as shades of tan, brown or black, or areas of white, red, or blue.    D is for Diameter  While melanomas are usually greater than 6 millimeters, or about the size of a pencil eraser, when diagnosed, they can be smaller.    E is for Evolving  The spot looks different from the rest or is changing in size, shape, or color.    SUNSCREEN AND SUN PROTECTION    Ultraviolet radiation from the sun is the main cause of skin cancer as well as sun damage (brown spots, wrinkles and more).  Your best protection from the sun is to stay out of the mid-day sun (from 10am-3pm), seek shade, and cover your skin with clothing and hats.  Wear a swim shirt when swimming.  Sunscreen should be used to areas that aren't covered, including lips.    We prefer sunscreens that are SPF 30 or higher.  Sunscreens should be applied liberally and reapplied every 2 hours, more often when swimming or sweating.    If you will be sweating or swimming, choose a sunscreen that is labeled \"Water resistant 80 minutes\".  This is the highest waterproof rating from the FDA.      Use a moisturizer with sunscreen daily to protect your sun-exposed areas such as the face, neck and backs of hands.  Some drugstore brands to try are Neutrogena Healthy Defense Daily Moisturizer (PureScreen) SPF 50 or CeraVe Face Lotion Invisible Zinc SPF 50.  Elta MD products are slightly more expensive and must be ordered through Amazon or the Elta website.  We like their UV Daily or UV Clear.      For body sunscreen when doing outdoor activity, some to try include Sun Bum products, Aveeno Baby Continuous Protection SPF 50 for sensitive skin, Blue Lizard SPF 30+, All Good sport sunscreen SPF 50, or Banana Boat Simply " Protect Sport Sunscreen lotion spf 50.  Sticks, gels, and sprays are also great and can be used for areas of the body that are difficult to cover with lotion.    If you have brown spots such as melasma or lentigenes, choose a tinted sunscreen.  There are ingredients in tinted sunscreens (iron oxide) that do a better job blocking certain types of light that cause brown spots.  We like Elta MD UV Clear tinted or Elta MD UV Daily tinted, which can be ordered on [employer] or from [employer]. You can also try Coola Mineral Face Matte Moisturizer SPF 30 or Australian Gold Botaniacal Suncreen SPF 50 Tinted Face Mineral Lotion.    There are two types of sunscreens: Chemical sunscreens, such as those that contain the ingredients avobenzone and oxybenzone, and Physical sunscreens, such as those that contain Zinc oxide and Titanium dioxide. Chemical sunscreens absorb light and absorb into the skin.  They must be applied 15 minutes before sun exposure.  Physical sunscreens reflect the light and are not absorbed into the skin.  They should be applied 5 minutes before sun exposure.  Some patients worry about the effects of sunscreens that are absorbed into the skin.  If you are worried about this, use the physical (zinc/titanium sunscreens)- look at the label before buying.  There is lots of scientific evidence that sunlight causes cancer, but there is no direct evidence that sunscreens are harmful.  However, the FDA has asked for further study of the chemical sunscreens to make sure they do not have any health effects on humans.

## 2024-08-27 NOTE — PROGRESS NOTES
Subjective   Gavin Hawkins Jr. is a 74 y.o. male who presents for the following: Skin Check (LV: 2/21/24: FBSE. No bleeding, changing, painful or itchy lesions today.)    Skin Cancer History  Moderately dysplastic nevus - left lower back s/p excision 3/14/23  SCC - right superior scalp s/p Mohs 9/21/21  Mildly dysplastic nevus - left posterior shoulder s/p excision 9/25/20  Melanoma - back s/p excision 2005  AK's     Family History of Skin Cancer  None    The following portions of the chart were reviewed this encounter and updated as appropriate:         Review of Systems: No other skin or systemic complaints.    Objective   Well appearing patient in no apparent distress; mood and affect are within normal limits.    A full examination was performed including scalp, head, eyes, ears, nose, lips, neck, chest, axillae, abdomen, back, buttocks, bilateral upper extremities, bilateral lower extremities, hands, feet, fingers, toes, fingernails, and toenails. All findings within normal limits unless otherwise noted below.    Scattered cherry-red papule(s).    Well healed scar(s) at site(s) of prior treatment    Scattered tan macules in sun-exposed areas.    Stuck on verrucous, tan-brown papules and plaques.      Scattered, uniform and benign-appearing, regular brown melanocytic papules and macules.    Generalized, Left Malar Cheek  Small yellow, lobulated papules with a central dell.    Left Forehead, Left Temple, Mid Forehead, Mid Frontal Scalp, Mid Parietal Scalp (2), Right Forehead, Right Temple  Erythematous macules with gritty scale.    Mid Abdomen - Upper  Irregular brown papule            Assessment/Plan   Hemangioma of skin    Reassured of benign nature of lesions    Personal history of skin cancer    No evidence of recurrence at site(s) of prior treatment  Continue photoprotection with sun-protective clothing and sunscreen SPF 30+ daily  Continue routine self-skin examination  Continue to follow up every 6  months for routine FBSE or earlier prn for new/changing/concerning lesions       Related Procedures  Follow Up In Dermatology - Established Patient    Personal history of malignant melanoma of skin    Related Procedures  Follow Up In Dermatology - Established Patient    Personal history of squamous cell carcinoma of skin    Related Procedures  Follow Up In Dermatology - Established Patient    Lentigo    Reassured of benign nature of lesions    Seborrheic keratosis    Reassured of benign nature of lesions    Multiple benign nevi    Reassured of benign nature of lesions    Sebaceous hyperplasia of face (2)  Left Malar Cheek; Generalized    Reassured of benign nature of lesions    Actinic keratosis (8)  Mid Frontal Scalp; Mid Parietal Scalp (2); Mid Forehead; Left Hindu; Right Temple; Left Forehead; Right Forehead    Discussed precancerous nature of condition and relationship with sun-exposure.   Recommended treatment today with LN2. Discussed r/b of procedure including risk of pain, temporary redness, and dyspigmentation. Patient verbalized understanding and agreement with plan for treatment today.    Destr of lesion - Left Forehead, Left Temple, Mid Forehead, Mid Frontal Scalp, Mid Parietal Scalp (2), Right Forehead, Right Temple  Complexity: simple    Destruction method: cryotherapy    Informed consent: discussed and consent obtained    Lesion destroyed using liquid nitrogen: Yes    Region frozen until ice ball extended beyond lesion: Yes    Cryotherapy cycles:  1  Outcome: patient tolerated procedure well with no complications    Post-procedure details: wound care instructions given      Neoplasm of uncertain behavior of skin  Mid Abdomen - Upper    Lesion biopsy  Type of biopsy: tangential    Informed consent: discussed and consent obtained    Timeout: patient name, date of birth, surgical site, and procedure verified    Procedure prep:  Patient was prepped and draped  Anesthesia: the lesion was anesthetized in a  standard fashion    Anesthetic:  1% lidocaine w/ epinephrine 1-100,000 local infiltration  Instrument used: DermaBlade    Hemostasis achieved with: aluminum chloride    Outcome: patient tolerated procedure well    Post-procedure details: sterile dressing applied and wound care instructions given    Dressing type: petrolatum and bandage      Staff Communication: Dermatology Local Anesthesia: 1 % Lidocaine / Epinephrine - Amount: 0.2cc    Specimen 1 - Dermatopathology- DERM LAB  Differential Diagnosis: r/o dysplastic nevus  Check Margins Yes/No?:    Comments:    Dermpath Lab: Routine Histopathology (formalin-fixed tissue)    -Discussed uncertain diagnosis of lesion and recommended biopsy to help clarify diagnosis. -Discussed R/B of procedure including risk of scar. Patient verbalized understanding and agreement with plan for biopsy today.    Continue to follow up every 6 months for routine FBSE or earlier prn for new/changing/concerning lesions    Scribe Attestation  By signing my name below, IDeirdre LPN , Scribe   attest that this documentation has been prepared under the direction and in the presence of Moe Barraza MD.

## 2024-08-28 ENCOUNTER — APPOINTMENT (OUTPATIENT)
Dept: DERMATOLOGY | Facility: CLINIC | Age: 74
End: 2024-08-28
Payer: MEDICARE

## 2024-08-28 ENCOUNTER — LAB (OUTPATIENT)
Dept: LAB | Facility: LAB | Age: 74
End: 2024-08-28
Payer: MEDICARE

## 2024-08-28 DIAGNOSIS — L81.4 LENTIGO: ICD-10-CM

## 2024-08-28 DIAGNOSIS — L57.0 ACTINIC KERATOSIS: ICD-10-CM

## 2024-08-28 DIAGNOSIS — Z12.5 ENCOUNTER FOR PROSTATE CANCER SCREENING: ICD-10-CM

## 2024-08-28 DIAGNOSIS — D48.5 NEOPLASM OF UNCERTAIN BEHAVIOR OF SKIN: ICD-10-CM

## 2024-08-28 DIAGNOSIS — E11.40 CONTROLLED TYPE 2 DIABETES MELLITUS WITH DIABETIC NEUROPATHY, WITHOUT LONG-TERM CURRENT USE OF INSULIN (MULTI): ICD-10-CM

## 2024-08-28 DIAGNOSIS — Z85.828 PERSONAL HISTORY OF SKIN CANCER: ICD-10-CM

## 2024-08-28 DIAGNOSIS — Z85.828 PERSONAL HISTORY OF SQUAMOUS CELL CARCINOMA OF SKIN: ICD-10-CM

## 2024-08-28 DIAGNOSIS — Z85.820 PERSONAL HISTORY OF MALIGNANT MELANOMA OF SKIN: ICD-10-CM

## 2024-08-28 DIAGNOSIS — Z00.00 ROUTINE GENERAL MEDICAL EXAMINATION AT HEALTH CARE FACILITY: ICD-10-CM

## 2024-08-28 DIAGNOSIS — D18.01 HEMANGIOMA OF SKIN: Primary | ICD-10-CM

## 2024-08-28 DIAGNOSIS — L73.8 SEBACEOUS HYPERPLASIA OF FACE: ICD-10-CM

## 2024-08-28 DIAGNOSIS — L82.1 SEBORRHEIC KERATOSIS: ICD-10-CM

## 2024-08-28 DIAGNOSIS — D22.9 MULTIPLE BENIGN NEVI: ICD-10-CM

## 2024-08-28 LAB
ALBUMIN SERPL BCP-MCNC: 4.2 G/DL (ref 3.4–5)
ALP SERPL-CCNC: 53 U/L (ref 33–136)
ALT SERPL W P-5'-P-CCNC: 18 U/L (ref 10–52)
ANION GAP SERPL CALC-SCNC: 16 MMOL/L (ref 10–20)
AST SERPL W P-5'-P-CCNC: 18 U/L (ref 9–39)
BILIRUB SERPL-MCNC: 0.9 MG/DL (ref 0–1.2)
BUN SERPL-MCNC: 17 MG/DL (ref 6–23)
CALCIUM SERPL-MCNC: 9.4 MG/DL (ref 8.6–10.3)
CHLORIDE SERPL-SCNC: 98 MMOL/L (ref 98–107)
CHOLEST SERPL-MCNC: 152 MG/DL (ref 0–199)
CHOLESTEROL/HDL RATIO: 3.1
CO2 SERPL-SCNC: 28 MMOL/L (ref 21–32)
CREAT SERPL-MCNC: 1.15 MG/DL (ref 0.5–1.3)
EGFRCR SERPLBLD CKD-EPI 2021: 66 ML/MIN/1.73M*2
ERYTHROCYTE [DISTWIDTH] IN BLOOD BY AUTOMATED COUNT: 13.9 % (ref 11.5–14.5)
GLUCOSE SERPL-MCNC: 91 MG/DL (ref 74–99)
HCT VFR BLD AUTO: 43.4 % (ref 41–52)
HDLC SERPL-MCNC: 49.6 MG/DL
HGB BLD-MCNC: 13.9 G/DL (ref 13.5–17.5)
LDLC SERPL CALC-MCNC: 82 MG/DL
MAGNESIUM SERPL-MCNC: 1.63 MG/DL (ref 1.6–2.4)
MCH RBC QN AUTO: 28 PG (ref 26–34)
MCHC RBC AUTO-ENTMCNC: 32 G/DL (ref 32–36)
MCV RBC AUTO: 88 FL (ref 80–100)
NON HDL CHOLESTEROL: 102 MG/DL (ref 0–149)
NRBC BLD-RTO: 0 /100 WBCS (ref 0–0)
PLATELET # BLD AUTO: 191 X10*3/UL (ref 150–450)
POTASSIUM SERPL-SCNC: 3.6 MMOL/L (ref 3.5–5.3)
PROT SERPL-MCNC: 6.9 G/DL (ref 6.4–8.2)
PSA SERPL-MCNC: 1.93 NG/ML
RBC # BLD AUTO: 4.96 X10*6/UL (ref 4.5–5.9)
SODIUM SERPL-SCNC: 138 MMOL/L (ref 136–145)
TRIGL SERPL-MCNC: 103 MG/DL (ref 0–149)
TSH SERPL-ACNC: 1.51 MIU/L (ref 0.44–3.98)
VLDL: 21 MG/DL (ref 0–40)
WBC # BLD AUTO: 7.6 X10*3/UL (ref 4.4–11.3)

## 2024-08-28 PROCEDURE — 17003 DESTRUCT PREMALG LES 2-14: CPT | Performed by: STUDENT IN AN ORGANIZED HEALTH CARE EDUCATION/TRAINING PROGRAM

## 2024-08-28 PROCEDURE — 36415 COLL VENOUS BLD VENIPUNCTURE: CPT

## 2024-08-28 PROCEDURE — 11102 TANGNTL BX SKIN SINGLE LES: CPT | Performed by: STUDENT IN AN ORGANIZED HEALTH CARE EDUCATION/TRAINING PROGRAM

## 2024-08-28 PROCEDURE — 1157F ADVNC CARE PLAN IN RCRD: CPT | Performed by: STUDENT IN AN ORGANIZED HEALTH CARE EDUCATION/TRAINING PROGRAM

## 2024-08-28 PROCEDURE — 99213 OFFICE O/P EST LOW 20 MIN: CPT | Performed by: STUDENT IN AN ORGANIZED HEALTH CARE EDUCATION/TRAINING PROGRAM

## 2024-08-28 PROCEDURE — 1123F ACP DISCUSS/DSCN MKR DOCD: CPT | Performed by: STUDENT IN AN ORGANIZED HEALTH CARE EDUCATION/TRAINING PROGRAM

## 2024-08-28 PROCEDURE — 17000 DESTRUCT PREMALG LESION: CPT | Performed by: STUDENT IN AN ORGANIZED HEALTH CARE EDUCATION/TRAINING PROGRAM

## 2024-08-28 PROCEDURE — G0103 PSA SCREENING: HCPCS

## 2024-08-28 ASSESSMENT — DERMATOLOGY QUALITY OF LIFE (QOL) ASSESSMENT
DATE THE QUALITY-OF-LIFE ASSESSMENT WAS COMPLETED: 67080
ARE THERE EXCLUSIONS OR EXCEPTIONS FOR THE QUALITY OF LIFE ASSESSMENT: NO
RATE HOW BOTHERED YOU ARE BY SYMPTOMS OF YOUR SKIN PROBLEM (EG, ITCHING, STINGING BURNING, HURTING OR SKIN IRRITATION): 0 - NEVER BOTHERED
RATE HOW EMOTIONALLY BOTHERED YOU ARE BY YOUR SKIN PROBLEM (FOR EXAMPLE, WORRY, EMBARRASSMENT, FRUSTRATION): 0 - NEVER BOTHERED
WHAT SINGLE SKIN CONDITION LISTED BELOW IS THE PATIENT ANSWERING THE QUALITY-OF-LIFE ASSESSMENT QUESTIONS ABOUT: ACTINIC KERATOSIS
RATE HOW BOTHERED YOU ARE BY EFFECTS OF YOUR SKIN PROBLEMS ON YOUR ACTIVITIES (EG, GOING OUT, ACCOMPLISHING WHAT YOU WANT, WORK ACTIVITIES OR YOUR RELATIONSHIPS WITH OTHERS): 0 - NEVER BOTHERED

## 2024-08-28 ASSESSMENT — PATIENT GLOBAL ASSESSMENT (PGA): PATIENT GLOBAL ASSESSMENT: PATIENT GLOBAL ASSESSMENT:  1 - CLEAR

## 2024-08-30 LAB
LAB AP ASR DISCLAIMER: NORMAL
LABORATORY COMMENT REPORT: NORMAL
PATH REPORT.FINAL DX SPEC: NORMAL
PATH REPORT.GROSS SPEC: NORMAL
PATH REPORT.RELEVANT HX SPEC: NORMAL
PATH REPORT.TOTAL CANCER: NORMAL

## 2024-09-03 LAB
LAB AP ASR DISCLAIMER: NORMAL
LABORATORY COMMENT REPORT: NORMAL
PATH REPORT.ADDENDUM SPEC: NORMAL
PATH REPORT.FINAL DX SPEC: NORMAL
PATH REPORT.GROSS SPEC: NORMAL
PATH REPORT.RELEVANT HX SPEC: NORMAL
PATH REPORT.TOTAL CANCER: NORMAL

## 2024-09-03 NOTE — RESULT ENCOUNTER NOTE
Mildly abnormal mole with some severely abnormal cells, and present on margin. Recommend excision (MSO slot with me or derm surg).

## 2024-09-04 DIAGNOSIS — D23.9 DYSPLASTIC NEVUS: ICD-10-CM

## 2024-09-04 NOTE — RESULT ENCOUNTER NOTE
Spoke with pt and discussed results and plan for excision.  Pt has been scheduled and referral placed.  Deirdre Ryder LPN

## 2024-09-05 ENCOUNTER — OFFICE VISIT (OUTPATIENT)
Dept: NEUROLOGY | Facility: CLINIC | Age: 74
End: 2024-09-05
Payer: MEDICARE

## 2024-09-05 VITALS
DIASTOLIC BLOOD PRESSURE: 60 MMHG | WEIGHT: 218 LBS | BODY MASS INDEX: 30.52 KG/M2 | HEART RATE: 79 BPM | HEIGHT: 71 IN | SYSTOLIC BLOOD PRESSURE: 99 MMHG

## 2024-09-05 DIAGNOSIS — G70.00 MYASTHENIA GRAVIS (MULTI): ICD-10-CM

## 2024-09-05 PROCEDURE — 1123F ACP DISCUSS/DSCN MKR DOCD: CPT | Performed by: NURSE PRACTITIONER

## 2024-09-05 PROCEDURE — 1157F ADVNC CARE PLAN IN RCRD: CPT | Performed by: NURSE PRACTITIONER

## 2024-09-05 PROCEDURE — 3048F LDL-C <100 MG/DL: CPT | Performed by: NURSE PRACTITIONER

## 2024-09-05 PROCEDURE — 1159F MED LIST DOCD IN RCRD: CPT | Performed by: NURSE PRACTITIONER

## 2024-09-05 PROCEDURE — 99214 OFFICE O/P EST MOD 30 MIN: CPT | Performed by: NURSE PRACTITIONER

## 2024-09-05 PROCEDURE — 3078F DIAST BP <80 MM HG: CPT | Performed by: NURSE PRACTITIONER

## 2024-09-05 PROCEDURE — 3074F SYST BP LT 130 MM HG: CPT | Performed by: NURSE PRACTITIONER

## 2024-09-05 PROCEDURE — 1036F TOBACCO NON-USER: CPT | Performed by: NURSE PRACTITIONER

## 2024-09-05 RX ORDER — PYRIDOSTIGMINE BROMIDE 60 MG/1
60 TABLET ORAL 4 TIMES DAILY
Qty: 360 TABLET | Refills: 3 | Status: SHIPPED | OUTPATIENT
Start: 2024-09-05

## 2024-09-05 ASSESSMENT — ANXIETY QUESTIONNAIRES
IF YOU CHECKED OFF ANY PROBLEMS ON THIS QUESTIONNAIRE, HOW DIFFICULT HAVE THESE PROBLEMS MADE IT FOR YOU TO DO YOUR WORK, TAKE CARE OF THINGS AT HOME, OR GET ALONG WITH OTHER PEOPLE: NOT DIFFICULT AT ALL
5. BEING SO RESTLESS THAT IT IS HARD TO SIT STILL: NOT AT ALL
2. NOT BEING ABLE TO STOP OR CONTROL WORRYING: NOT AT ALL
3. WORRYING TOO MUCH ABOUT DIFFERENT THINGS: NOT AT ALL
7. FEELING AFRAID AS IF SOMETHING AWFUL MIGHT HAPPEN: NOT AT ALL
6. BECOMING EASILY ANNOYED OR IRRITABLE: NOT AT ALL
4. TROUBLE RELAXING: NOT AT ALL
1. FEELING NERVOUS, ANXIOUS, OR ON EDGE: NOT AT ALL
GAD7 TOTAL SCORE: 0

## 2024-09-05 ASSESSMENT — ENCOUNTER SYMPTOMS
LOSS OF SENSATION IN FEET: 0
OCCASIONAL FEELINGS OF UNSTEADINESS: 0
DEPRESSION: 0

## 2024-09-05 NOTE — PROGRESS NOTES
Patient being assessed today for follow-up of myasthenia gravis.  He has been doing well on the Mestinon as he has been taking it.  Denies any chewing problems.  Denies swallowing problems.  Denies balance issues.  Denies droopy eye.  Would like to continue the Mestinon as he has been taking it.  Discussed role of medicine, importance of taking medications, potential risks, benefits, and precautions to be taken.  Reviewed sleep hygiene and dietary modifications.  Follow-up in 1 year or sooner if needed.    This note was created with voice recognition software and was not corrected for typographical or grammatical errors

## 2024-09-08 DIAGNOSIS — E11.40 CONTROLLED TYPE 2 DIABETES MELLITUS WITH DIABETIC NEUROPATHY, WITHOUT LONG-TERM CURRENT USE OF INSULIN (MULTI): ICD-10-CM

## 2024-09-09 RX ORDER — SEMAGLUTIDE 0.68 MG/ML
INJECTION, SOLUTION SUBCUTANEOUS
Qty: 3 ML | Refills: 3 | Status: SHIPPED | OUTPATIENT
Start: 2024-09-09

## 2024-09-10 ENCOUNTER — APPOINTMENT (OUTPATIENT)
Dept: UROLOGY | Facility: CLINIC | Age: 74
End: 2024-09-10
Payer: MEDICARE

## 2024-09-10 VITALS
SYSTOLIC BLOOD PRESSURE: 118 MMHG | BODY MASS INDEX: 30.52 KG/M2 | HEIGHT: 71 IN | TEMPERATURE: 97 F | DIASTOLIC BLOOD PRESSURE: 76 MMHG | HEART RATE: 103 BPM | WEIGHT: 218 LBS

## 2024-09-10 DIAGNOSIS — R39.15 URINARY URGENCY: Primary | ICD-10-CM

## 2024-09-10 DIAGNOSIS — Z00.00 ROUTINE GENERAL MEDICAL EXAMINATION AT HEALTH CARE FACILITY: ICD-10-CM

## 2024-09-10 DIAGNOSIS — N39.41 URGE INCONTINENCE OF URINE: ICD-10-CM

## 2024-09-10 DIAGNOSIS — E11.40 CONTROLLED TYPE 2 DIABETES MELLITUS WITH DIABETIC NEUROPATHY, WITHOUT LONG-TERM CURRENT USE OF INSULIN (MULTI): ICD-10-CM

## 2024-09-10 LAB
MUCOUS THREADS #/AREA URNS AUTO: NORMAL /LPF
POC APPEARANCE, URINE: CLEAR
POC BILIRUBIN, URINE: NEGATIVE
POC BLOOD, URINE: NEGATIVE
POC COLOR, URINE: YELLOW
POC GLUCOSE, URINE: NEGATIVE MG/DL
POC KETONES, URINE: ABNORMAL MG/DL
POC LEUKOCYTES, URINE: NEGATIVE
POC NITRITE,URINE: NEGATIVE
POC PH, URINE: 6 PH
POC PROTEIN, URINE: NEGATIVE MG/DL
POC SPECIFIC GRAVITY, URINE: 1.01
POC UROBILINOGEN, URINE: 2 EU/DL
RBC #/AREA URNS AUTO: NORMAL /HPF
WBC #/AREA URNS AUTO: NORMAL /HPF

## 2024-09-10 PROCEDURE — 3078F DIAST BP <80 MM HG: CPT | Performed by: NURSE PRACTITIONER

## 2024-09-10 PROCEDURE — 1157F ADVNC CARE PLAN IN RCRD: CPT | Performed by: NURSE PRACTITIONER

## 2024-09-10 PROCEDURE — 1123F ACP DISCUSS/DSCN MKR DOCD: CPT | Performed by: NURSE PRACTITIONER

## 2024-09-10 PROCEDURE — 3048F LDL-C <100 MG/DL: CPT | Performed by: NURSE PRACTITIONER

## 2024-09-10 PROCEDURE — 81001 URINALYSIS AUTO W/SCOPE: CPT

## 2024-09-10 PROCEDURE — 99213 OFFICE O/P EST LOW 20 MIN: CPT | Performed by: NURSE PRACTITIONER

## 2024-09-10 PROCEDURE — 81003 URINALYSIS AUTO W/O SCOPE: CPT | Performed by: NURSE PRACTITIONER

## 2024-09-10 PROCEDURE — G2211 COMPLEX E/M VISIT ADD ON: HCPCS | Performed by: NURSE PRACTITIONER

## 2024-09-10 PROCEDURE — 1159F MED LIST DOCD IN RCRD: CPT | Performed by: NURSE PRACTITIONER

## 2024-09-10 PROCEDURE — 3074F SYST BP LT 130 MM HG: CPT | Performed by: NURSE PRACTITIONER

## 2024-09-10 RX ORDER — MIRABEGRON 25 MG/1
25 TABLET, FILM COATED, EXTENDED RELEASE ORAL DAILY
Qty: 30 TABLET | Refills: 2 | Status: SHIPPED | OUTPATIENT
Start: 2024-09-10 | End: 2024-12-09

## 2024-09-10 NOTE — PROGRESS NOTES
Subjective   Patient ID: Gavin Hawkins Jr. is a 74 y.o. male who presents for surgery follow up.  History of severe LUTS and severely enlarged prostate, approximately 200 gm. History of elevated PSA > 10 with negative biopsy in Jan 2017. PSA 1.93 in August 2024.      Patient is s/p HOLEP in Sept 2019 with Dr. Montiel. He was previously on tamsulosin briefly after HOLEP and stopped myrbetriq shortly after. He is having severe urgency and UUI overnight and during the day. Wearing depends, 1/day.       Patient has severe ED. He has tried tadalafil up to 20mg with minimal response. He is having some sensation of orgasm at this time.      Drinks moderate amount of tea and water. Avoids fluids at least 2 hours before bed.           Review of Systems   All other systems reviewed and are negative.      Objective   Physical Exam  Vitals reviewed.     Alert and oriented x3  Moist mucous membranes  Breathes easily on room air  Abdomen soft, nondistended. Obese  No edema  No scleral icterus  No focal neurological deficits  Appears stated age, no acute distress    Office Visit on 09/10/2024   Component Date Value Ref Range Status    POC Color, Urine 09/10/2024 Yellow  Straw, Yellow, Light-Yellow Final    POC Appearance, Urine 09/10/2024 Clear  Clear Final    POC Glucose, Urine 09/10/2024 NEGATIVE  NEGATIVE mg/dl Final    POC Bilirubin, Urine 09/10/2024 NEGATIVE  NEGATIVE Final    POC Ketones, Urine 09/10/2024 TRACE (A)  NEGATIVE mg/dl Final    POC Specific Gravity, Urine 09/10/2024 1.015  1.005 - 1.035 Final    POC Blood, Urine 09/10/2024 NEGATIVE  NEGATIVE Final    POC PH, Urine 09/10/2024 6.0  No Reference Range Established PH Final    POC Protein, Urine 09/10/2024 NEGATIVE  NEGATIVE, 30 (1+) mg/dl Final    POC Urobilinogen, Urine 09/10/2024 2.0 (A)  0.2, 1.0 EU/DL Final    Poc Nitrite, Urine 09/10/2024 NEGATIVE  NEGATIVE Final    POC Leukocytes, Urine 09/10/2024 NEGATIVE  NEGATIVE Final         Assessment/Plan   Diagnoses  and all orders for this visit:  Urinary urgency  -     POCT UA Automated manually resulted  -     Post-Void Residual  -     mirabegron (Myrbetriq) 25 mg tablet extended release 24 hr 24 hr tablet; Take 1 tablet (25 mg) by mouth once daily.  Urge incontinence of urine    Plan to trial myrbetriq. Will monitor BP. Plan for follow up in a few months or sooner if needed.       Denia Cast, BHARAT-CNP 09/10/24 8:35 AM

## 2024-09-21 DIAGNOSIS — I10 PRIMARY HYPERTENSION: ICD-10-CM

## 2024-09-23 RX ORDER — LOSARTAN POTASSIUM AND HYDROCHLOROTHIAZIDE 25; 100 MG/1; MG/1
1 TABLET ORAL DAILY
Qty: 90 TABLET | Refills: 3 | Status: SHIPPED | OUTPATIENT
Start: 2024-09-23

## 2024-09-27 ENCOUNTER — HOSPITAL ENCOUNTER (OUTPATIENT)
Dept: RADIOLOGY | Facility: HOSPITAL | Age: 74
Discharge: HOME | End: 2024-09-27
Payer: MEDICARE

## 2024-09-27 DIAGNOSIS — Z00.00 ROUTINE GENERAL MEDICAL EXAMINATION AT HEALTH CARE FACILITY: ICD-10-CM

## 2024-09-27 DIAGNOSIS — R91.1 INCIDENTAL LUNG NODULE, > 3MM AND < 8MM: ICD-10-CM

## 2024-09-27 PROCEDURE — 75571 CT HRT W/O DYE W/CA TEST: CPT

## 2024-09-27 PROCEDURE — 71250 CT THORAX DX C-: CPT

## 2024-10-10 ENCOUNTER — APPOINTMENT (OUTPATIENT)
Dept: PODIATRY | Facility: CLINIC | Age: 74
End: 2024-10-10
Payer: MEDICARE

## 2024-10-10 DIAGNOSIS — M79.675 PAIN IN TOES OF BOTH FEET: ICD-10-CM

## 2024-10-10 DIAGNOSIS — M79.674 PAIN IN TOES OF BOTH FEET: ICD-10-CM

## 2024-10-10 DIAGNOSIS — E11.40 CONTROLLED TYPE 2 DIABETES MELLITUS WITH DIABETIC NEUROPATHY, WITHOUT LONG-TERM CURRENT USE OF INSULIN: ICD-10-CM

## 2024-10-10 DIAGNOSIS — G62.9 PERIPHERAL POLYNEUROPATHY: Primary | ICD-10-CM

## 2024-10-10 DIAGNOSIS — B35.1 ONYCHOMYCOSIS: ICD-10-CM

## 2024-10-10 PROCEDURE — 99213 OFFICE O/P EST LOW 20 MIN: CPT | Performed by: PODIATRIST

## 2024-10-10 NOTE — PROGRESS NOTES
History Of Present Illness  Gavin Hawkins Jr. is a 74 y.o. male presenting for diabetic nail care. Patient complains with painful elongated nails.  Patient states that the duloxetine he has tried has not given him any relief.  He has now failed to oral agents for neuropathy.    PCP Salvatore Madera DO  Last visit 8/16/24     Past Medical History  He has a past medical history of Muscle weakness (generalized) (05/21/2018), Personal history of diseases of the skin and subcutaneous tissue (02/01/2019), Personal history of other diseases of male genital organs, Personal history of other infectious and parasitic diseases (02/04/2019), and Radiculopathy, cervical region (10/30/2015).    Surgical History  He has a past surgical history that includes Other surgical history (04/28/2022) and Other surgical history (04/24/2018).     Social History  He reports that he has never smoked. He has never used smokeless tobacco. He reports current alcohol use. He reports that he does not use drugs.    Family History  Family History   Problem Relation Name Age of Onset    Cataracts Mother      Other (smoking) Mother      Diabetes Father      Other (cardiac disorder) Father          Allergies  Ciprofloxacin    Medications  Current Outpatient Medications   Medication Sig Dispense Refill    amLODIPine (Norvasc) 10 mg tablet Take 1 tablet by mouth once daily for blood pressure 90 tablet 3    ascorbic acid, vitamin C, 500 mg capsule Take by mouth.      DULoxetine (Cymbalta) 30 mg DR capsule Take 1 capsule (30 mg) by mouth once daily. Do not crush or chew. 30 capsule 11    ketoconazole (NIZOral) 2 % shampoo APPLY 1 APPLICATION TOPICALLY AS BODY WASH 2-3 TIMES A WEEK TO AFFECTED AREA ON CHEST UNTIL CLEAR. MAY REPEAT AS NEEDED FOR FLARES.      losartan-hydrochlorothiazide (Hyzaar) 100-25 mg tablet Take 1 tablet by mouth once daily 90 tablet 3    metFORMIN (Glucophage) 1,000 mg tablet TAKE 1 TABLET BY MOUTH TWICE DAILY WITH MEALS 180  tablet 3    mirabegron (Myrbetriq) 25 mg tablet extended release 24 hr 24 hr tablet Take 1 tablet (25 mg) by mouth once daily. 30 tablet 2    omeprazole (PriLOSEC) 40 mg DR capsule Take 1 capsule (40 mg) by mouth once daily in the morning. Take before meals. Do not crush or chew. 90 capsule 3    ONETOUCH DELICA LANCETS MISC Fine; Use 1 to check glucose once daily      OneTouch Ultra Test strip USE 1 STRIP TO CHECK GLUCOSE ONCE DAILY      Ozempic 0.25 mg or 0.5 mg (2 mg/3 mL) pen injector INJECT 0.25MG SUBCUTANEOUSLY ONCE A WEEK FOR 30  DAYS,  THEN INJECT 0.5MG SUBCUTANEOUSLY ONCE WEEKLY 3 mL 3    pioglitazone (Actos) 15 mg tablet Take 1 tablet by mouth once daily 90 tablet 3    pravastatin (Pravachol) 20 mg tablet TAKE 1 TABLET BY MOUTH ONCE DAILY AS DIRECTED 90 tablet 3    pyridostigmine (Mestinon) 60 mg tablet Take 1 tablet (60 mg) by mouth 4 times a day. 4 times daily 360 tablet 3    SITagliptin phosphate (Januvia) 25 mg tablet Take 1 tablet (25 mg) by mouth once daily. 30 tablet 11    vit A,C and E-lutein-minerals (Ocuvite with Lutein) 300 mcg-200 mg-27 mg-2 mg tablet Take by mouth.      vitamin B complex tablet Super B complex tabs       No current facility-administered medications for this visit.       Review of Systems    REVIEW OF SYSTEMS  GENERAL:  Negative for malaise, significant weight loss, fever  CARDIOVASCULAR: leg swelling   MUSCULOSKELETAL:  Negative for joint pain or swelling, back pain, and muscle pain.  SKIN:  Negative for lesions, rash, and itching  PSYCH:  Negative for sleep disturbance, mood disorder and recent psychosocial stressors  NEURO:  burning, tingling or numbness     Objective:   Vasc: DP and PT pulses are palpable bilateral.  CFT is less than 3 seconds bilateral.  Skin temperature is warm to cool proximal to distal bilateral.  positive edema and varicosities    Neuro:  Light touch is intact to the foot bilateral.  Protective sensation is decreased to the foot when tested with the  5.07 SWM bilateral.  There is no clonus noted.  The hallux is downgoing bilateral.  I cannot reproduce the neuropathic since patient's    Derm: Nails 1-5 bilateral are thickened, elongated and crumbly with subungual debris. Skin is supple with normal texture and turgor noted.  Webspaces are clean, dry and intact bilateral.  There are no hyperkeratoses, ulcerations, verruca or other lesions noted.  Unkempt    Ortho: Muscle strength is 5/5 for all pedal groups tested.  Ankle joint, subtalar joint, 1st MPJ and lesser MPJ ROM is full and without pain or crepitus.  The foot type is rectus bilateral off weight bearing.  There are no structural deformities noted.    Assessment/Plan   Patient has painful diabetic neuropathy: Patient has failed oral agents.  Will look into Qutenza again to see if this would be a covered item.  Patient has painful nail but mycosis  Toenails are debrided in length and thickness to avoid infection and for pain relief  Pt has both venous disease and neuropathy- we can first try  a 2 week period of compression hose.

## 2024-10-16 ENCOUNTER — APPOINTMENT (OUTPATIENT)
Dept: PRIMARY CARE | Facility: CLINIC | Age: 74
End: 2024-10-16
Payer: MEDICARE

## 2024-10-16 VITALS
HEIGHT: 71 IN | BODY MASS INDEX: 29.09 KG/M2 | OXYGEN SATURATION: 98 % | DIASTOLIC BLOOD PRESSURE: 70 MMHG | HEART RATE: 77 BPM | RESPIRATION RATE: 16 BRPM | WEIGHT: 207.8 LBS | SYSTOLIC BLOOD PRESSURE: 118 MMHG | TEMPERATURE: 97.8 F

## 2024-10-16 DIAGNOSIS — E11.40 CONTROLLED TYPE 2 DIABETES MELLITUS WITH DIABETIC NEUROPATHY, WITHOUT LONG-TERM CURRENT USE OF INSULIN: ICD-10-CM

## 2024-10-16 DIAGNOSIS — I10 PRIMARY HYPERTENSION: ICD-10-CM

## 2024-10-16 DIAGNOSIS — R91.1 INCIDENTAL LUNG NODULE, > 3MM AND < 8MM: ICD-10-CM

## 2024-10-16 DIAGNOSIS — R39.15 URINARY URGENCY: ICD-10-CM

## 2024-10-16 DIAGNOSIS — E78.2 MIXED HYPERLIPIDEMIA: Primary | ICD-10-CM

## 2024-10-16 RX ORDER — LOSARTAN POTASSIUM AND HYDROCHLOROTHIAZIDE 25; 100 MG/1; MG/1
1 TABLET ORAL DAILY
Qty: 90 TABLET | Refills: 3 | Status: SHIPPED | OUTPATIENT
Start: 2024-10-16

## 2024-10-16 RX ORDER — AMLODIPINE BESYLATE 10 MG/1
5 TABLET ORAL DAILY
Qty: 90 TABLET | Refills: 3 | Status: SHIPPED | OUTPATIENT
Start: 2024-10-16

## 2024-10-16 RX ORDER — ATORVASTATIN CALCIUM 40 MG/1
40 TABLET, FILM COATED ORAL DAILY
Qty: 90 TABLET | Refills: 3 | Status: SHIPPED | OUTPATIENT
Start: 2024-10-16 | End: 2025-10-16

## 2024-10-16 RX ORDER — MIRABEGRON 25 MG/1
25 TABLET, FILM COATED, EXTENDED RELEASE ORAL DAILY
Qty: 30 TABLET | Refills: 2 | Status: SHIPPED | OUTPATIENT
Start: 2024-10-16 | End: 2025-01-14

## 2024-10-16 ASSESSMENT — PAIN SCALES - GENERAL: PAINLEVEL_OUTOF10: 0-NO PAIN

## 2024-10-16 NOTE — PROGRESS NOTES
"Subjective   Gavin Hawkins Jr. is a 74 y.o. male who presents for Follow-up (Test results ).      Patient has not concerns today.  Reviewed CT Lung Cancer screening and follow up recommendations.  Reviewed Dermatology notes and findings as well as path reports with patient.  Reviewed podiatry notes and findings.  Patient has an interest in reducing medications and has on some occasion began the weaning process himself.  We discussed this at length today and reviewed again his medication list and the purpose for each medication.    He wanted to stop metformin and was going to hold Myrbetric at this time but will continue for now.            Review of Systems   All other systems reviewed and are negative.      Objective   /70   Pulse 77   Temp 36.6 °C (97.8 °F)   Resp 16   Ht 1.803 m (5' 11\")   Wt 94.3 kg (207 lb 12.8 oz)   SpO2 98%   BMI 28.98 kg/m²       Physical Exam  Constitutional:       Appearance: Normal appearance.   HENT:      Head: Normocephalic.   Eyes:      Conjunctiva/sclera: Conjunctivae normal.   Cardiovascular:      Rate and Rhythm: Normal rate and regular rhythm.      Heart sounds: Normal heart sounds.   Pulmonary:      Effort: Pulmonary effort is normal.      Breath sounds: Normal breath sounds.   Musculoskeletal:      Cervical back: Neck supple.   Skin:     General: Skin is warm and dry.   Neurological:      Mental Status: He is alert.         Assessment & Plan  Controlled type 2 diabetes mellitus with diabetic neuropathy, without long-term current use of insulin    Orders:    losartan-hydrochlorothiazide (Hyzaar) 100-25 mg tablet; Take 1 tablet by mouth once daily.    atorvastatin (Lipitor) 40 mg tablet; Take 1 tablet (40 mg) by mouth once daily.    Incidental lung nodule, > 3mm and < 8mm    Orders:    CT chest wo IV contrast; Future    Primary hypertension    Orders:    amLODIPine (Norvasc) 10 mg tablet; Take 0.5 tablets (5 mg) by mouth once daily. for blood pressure    " losartan-hydrochlorothiazide (Hyzaar) 100-25 mg tablet; Take 1 tablet by mouth once daily.    Urinary urgency    Orders:    mirabegron (Myrbetriq) 25 mg tablet extended release 24 hr 24 hr tablet; Take 1 tablet (25 mg) by mouth once daily.    Mixed hyperlipidemia    Orders:    atorvastatin (Lipitor) 40 mg tablet; Take 1 tablet (40 mg) by mouth once daily.    Patient has an interest in reducing medications and has on some occasion began the weaning process himself.  We discussed this at length today and reviewed again his medication list and the purpose for each medication.    He wanted to stop metformin and was going to hold Myrbetric at this time but will continue for now.   Can hold Januvia since it provides duplication and will increase Ozempic.  Will ask Pharmacy to follow for diabetes and medication review with patient.      Follow up in 3 months or sooner should symptoms worsen.        Salvatore Madera, DO

## 2024-10-16 NOTE — ASSESSMENT & PLAN NOTE
Orders:    losartan-hydrochlorothiazide (Hyzaar) 100-25 mg tablet; Take 1 tablet by mouth once daily.    atorvastatin (Lipitor) 40 mg tablet; Take 1 tablet (40 mg) by mouth once daily.

## 2024-10-16 NOTE — ASSESSMENT & PLAN NOTE
Orders:    amLODIPine (Norvasc) 10 mg tablet; Take 0.5 tablets (5 mg) by mouth once daily. for blood pressure    losartan-hydrochlorothiazide (Hyzaar) 100-25 mg tablet; Take 1 tablet by mouth once daily.

## 2024-10-17 ENCOUNTER — APPOINTMENT (OUTPATIENT)
Dept: DERMATOLOGY | Facility: CLINIC | Age: 74
End: 2024-10-17
Payer: MEDICARE

## 2024-10-17 DIAGNOSIS — D23.9 DYSPLASTIC NEVUS: ICD-10-CM

## 2024-10-17 PROCEDURE — 11403 EXC TR-EXT B9+MARG 2.1-3CM: CPT | Performed by: STUDENT IN AN ORGANIZED HEALTH CARE EDUCATION/TRAINING PROGRAM

## 2024-10-17 PROCEDURE — 12032 INTMD RPR S/A/T/EXT 2.6-7.5: CPT | Performed by: STUDENT IN AN ORGANIZED HEALTH CARE EDUCATION/TRAINING PROGRAM

## 2024-10-17 NOTE — PATIENT INSTRUCTIONS
Department of Dermatology    Daily Care of your Biopsy Site/Excision/ ED&C      If a specimen was sent to pathology, when your biopsy result is ready it will be sent to you and your medical care team at the exact same time. Please give your medical care team up to 3 business days to review the results and communicate the plan to you. If additional scheduling is needed, it may take the  an additional 2-3 weeks to contact you with the appropriate scheduling.     Leave the initial bandage on for 24 hours.  Keep the area dry.  After 24 hours, remove bandage, clean the area gently with mild soap and water in the shower.  Try to remove any crust that may have formed.  Pat dry.    Apply a thin layer of Vaseline ointment and cover with a Band-Aid.   Continue daily until stitches are removed or until the area has healed.     Discomfort: If you experience discomfort, you may take Tylenol (Acetaminophen) or Extra Strength Tylenol. If you don't have any allergies and are able to take Tylenol, take one to two tablets as directed on the bottle.   Bleeding: If bleeding occurs, do not remove the bandage.  Apply continuous firm pressure with gauze for 15 to 20 minutes with no peeking. If the bleeding persists, repeat the pressure for an additional 20 minutes.  If bleeding continues, you should notify us immediately.  If the office is closed, call [phone] and ask to page the dermatology resident doctor on call.  The resident on call will advise you with instructions or determine if you need to go to your nearest emergency room.  PLEASE do not go the emergency room without attempting to contact us first. Please notify us of any bleeding, as you will most likely need to have a bandage change.    Infection: Some soreness and redness is expected. It is normal to develop a red ring around the area and yellow leakage. Do not be alarmed. If the area becomes very  sore, red, and/or hot to the touch, or you have a fever, please notify us.  It is possible the area may be infected.     *If you have any questions or difficulties, please contact us.   Weekdays call [phone]  Evenings and Weekends call [phone] and ask to page the dermatology resident doctor on call.

## 2024-10-17 NOTE — PROGRESS NOTES
Excision Operative Note    Date of Surgery:  10/17/2024  Surgeon:  Moe Barraza MD    Pacemaker/Defibrillator: Denies  Blood Thinners: Denies  Joint/Valve Replacement: Denies     Assessment/Plan   Dysplastic nevus  Mid Abdomen- Upper    Skin excision - Mid Abdomen- Upper    Lesion length (cm):  1.4  Lesion width (cm):  1.6  Margin per side (cm):  0.3  Total excision diameter (cm):  2.2  Informed consent: discussed and consent obtained    Timeout: patient name, date of birth, surgical site, and procedure verified    Procedure prep:  Patient prepped in sterile fashion  Anesthesia: the lesion was anesthetized in a standard fashion    Anesthetic:  1% lidocaine w/ epinephrine 1-100,000 local infiltration  Instrument used: #15 blade    Hemostasis achieved with: electrodesiccation    Outcome: patient tolerated procedure well with no complications    Post-procedure details: sterile dressing applied and wound care instructions given    Dressing type: pressure dressing    Additional details:  That nature of the diagnosis was explained. The lesion is benign but has features concerning for the potential to progress to melanoma and complete excision is therefore recommended. Warning signs of melanoma were discussed. Patient was instructed to perform monthly self-skin examination. We recommended that the patient have regular total body skin exams given increased risk of skin cancers. The patient was instructed to use sun protective behaviors including use of broad spectrum sunscreens and sun protective clothing to reduce the risk of skin cancers.    Excision was recommended and discussed with the patient. The risks, benefits, and potential adverse effects were reviewed and the patient voiced understanding. Discussion included but was not limited to the cure rate, relative cost, wound care requirements, activity restrictions, and time to heal. Reconstruction options, risks, and benefits were reviewed including second intention  healing, and linear repair (4-1 ratio was explained).  Possible outcomes were reviewed including likely scar appearance, infection, bleeding and the need for revision surgery.    Skin repair - Mid Abdomen- Upper  Complexity:  Intermediate  Final length (cm):  5  Informed consent: discussed and consent obtained    Timeout: patient name, date of birth, surgical site, and procedure verified    Procedure prep:  Patient prepped in sterile fashion  Anesthesia: the lesion was anesthetized in a standard fashion    Anesthetic:  1% lidocaine w/ epinephrine 1-100,000 local infiltration  Reason for type of repair: reduce tension to allow closure, preserve normal anatomical and functional relationships and enhance both functionality and cosmetic results    Subcutaneous layers (deep stitches):   Suture size:  5-0  Suture type: Vicryl (polyglactin 910)    Fine/surface layer approximation (top stitches):   Suture size:  5-0  Suture type: Prolene (polypropylene)    Stitches: simple running    Suture removal (days):  10  Hemostasis achieved with: electrodesiccation  Outcome: patient tolerated procedure well with no complications    Post-procedure details: sterile dressing applied and wound care instructions given    Dressing type: petrolatum and pressure dressing      Staff Communication: Dermatology Local Anesthesia: 1 % Lidocaine / Epinephrine - Amount: 10 mL - Mid Abdomen- Upper    Skin Excision - Minor Surgery - Mid Abdomen- Upper    Specimen 1 - Dermatopathology- DERM LAB  Differential Diagnosis: Mildly dysplastic compound nevus  Check Margins Yes/No?:  Yes  Comments:    Dermpath Lab: Routine Histopathology (formalin-fixed tissue)    Related Procedures  Follow Up In Dermatology - Nurse Visit

## 2024-10-22 ENCOUNTER — APPOINTMENT (OUTPATIENT)
Dept: UROLOGY | Facility: CLINIC | Age: 74
End: 2024-10-22
Payer: MEDICARE

## 2024-10-22 LAB
LABORATORY COMMENT REPORT: NORMAL
PATH REPORT.FINAL DX SPEC: NORMAL
PATH REPORT.GROSS SPEC: NORMAL
PATH REPORT.MICROSCOPIC SPEC OTHER STN: NORMAL
PATH REPORT.RELEVANT HX SPEC: NORMAL
PATH REPORT.TOTAL CANCER: NORMAL

## 2024-10-28 ENCOUNTER — APPOINTMENT (OUTPATIENT)
Dept: DERMATOLOGY | Facility: CLINIC | Age: 74
End: 2024-10-28
Payer: MEDICARE

## 2024-10-28 DIAGNOSIS — D23.9 DYSPLASTIC NEVUS: ICD-10-CM

## 2024-10-28 DIAGNOSIS — Z48.02 ENCOUNTER FOR REMOVAL OF SUTURES: ICD-10-CM

## 2024-10-28 PROCEDURE — 99024 POSTOP FOLLOW-UP VISIT: CPT | Performed by: DERMATOLOGY

## 2024-10-29 ENCOUNTER — APPOINTMENT (OUTPATIENT)
Dept: PHARMACY | Facility: HOSPITAL | Age: 74
End: 2024-10-29
Payer: MEDICARE

## 2024-10-29 DIAGNOSIS — E11.40 CONTROLLED TYPE 2 DIABETES MELLITUS WITH DIABETIC NEUROPATHY, WITHOUT LONG-TERM CURRENT USE OF INSULIN: ICD-10-CM

## 2024-11-02 PROCEDURE — RXMED WILLOW AMBULATORY MEDICATION CHARGE

## 2024-11-06 ENCOUNTER — PHARMACY VISIT (OUTPATIENT)
Dept: PHARMACY | Facility: CLINIC | Age: 74
End: 2024-11-06
Payer: COMMERCIAL

## 2024-11-11 ENCOUNTER — APPOINTMENT (OUTPATIENT)
Dept: PODIATRY | Facility: CLINIC | Age: 74
End: 2024-11-11
Payer: MEDICARE

## 2024-11-11 DIAGNOSIS — E11.40 CONTROLLED TYPE 2 DIABETES MELLITUS WITH DIABETIC NEUROPATHY, WITHOUT LONG-TERM CURRENT USE OF INSULIN: Primary | ICD-10-CM

## 2024-11-11 PROCEDURE — 1157F ADVNC CARE PLAN IN RCRD: CPT | Performed by: PODIATRIST

## 2024-11-11 PROCEDURE — 99214 OFFICE O/P EST MOD 30 MIN: CPT | Performed by: PODIATRIST

## 2024-11-11 PROCEDURE — 3048F LDL-C <100 MG/DL: CPT | Performed by: PODIATRIST

## 2024-11-11 PROCEDURE — 1123F ACP DISCUSS/DSCN MKR DOCD: CPT | Performed by: PODIATRIST

## 2024-11-11 PROCEDURE — 1160F RVW MEDS BY RX/DR IN RCRD: CPT | Performed by: PODIATRIST

## 2024-11-11 PROCEDURE — 1159F MED LIST DOCD IN RCRD: CPT | Performed by: PODIATRIST

## 2024-11-11 NOTE — PROGRESS NOTES
History Of Present Illness  Gavin Hawkins Jr. is a 74 y.o. male presenting with chief complaint of: b/l diabetic neuropathy, in office today for qutenza application.    PCP Salvatore Madera DO  Last visit 10/16/24     Past Medical History  He has a past medical history of Muscle weakness (generalized) (05/21/2018), Personal history of diseases of the skin and subcutaneous tissue (02/01/2019), Personal history of other diseases of male genital organs, Personal history of other infectious and parasitic diseases (02/04/2019), and Radiculopathy, cervical region (10/30/2015).    Surgical History  He has a past surgical history that includes Other surgical history (04/28/2022) and Other surgical history (04/24/2018).     Social History  He reports that he has never smoked. He has never used smokeless tobacco. He reports current alcohol use. He reports that he does not use drugs.    Family History  Family History   Problem Relation Name Age of Onset    Cataracts Mother      Other (smoking) Mother      Diabetes Father      Other (cardiac disorder) Father          Allergies  Ciprofloxacin    Medications  Current Outpatient Medications   Medication Sig Dispense Refill    amLODIPine (Norvasc) 10 mg tablet Take 0.5 tablets (5 mg) by mouth once daily. for blood pressure 90 tablet 3    ascorbic acid, vitamin C, 500 mg capsule Take by mouth.      atorvastatin (Lipitor) 40 mg tablet Take 1 tablet (40 mg) by mouth once daily. 90 tablet 3    DULoxetine (Cymbalta) 30 mg DR capsule Take 1 capsule (30 mg) by mouth once daily. Do not crush or chew. 30 capsule 11    ketoconazole (NIZOral) 2 % shampoo APPLY 1 APPLICATION TOPICALLY AS BODY WASH 2-3 TIMES A WEEK TO AFFECTED AREA ON CHEST UNTIL CLEAR. MAY REPEAT AS NEEDED FOR FLARES.      losartan-hydrochlorothiazide (Hyzaar) 100-25 mg tablet Take 1 tablet by mouth once daily. 90 tablet 3    metFORMIN (Glucophage) 1,000 mg tablet TAKE 1 TABLET BY MOUTH TWICE DAILY WITH MEALS 180 tablet 3     mirabegron (Myrbetriq) 25 mg tablet extended release 24 hr 24 hr tablet Take 1 tablet (25 mg) by mouth once daily. 30 tablet 2    omeprazole (PriLOSEC) 40 mg DR capsule Take 1 capsule (40 mg) by mouth once daily in the morning. Take before meals. Do not crush or chew. 90 capsule 3    ONETOUCH DELICA LANCETS MISC Fine; Use 1 to check glucose once daily      OneTouch Ultra Test strip USE 1 STRIP TO CHECK GLUCOSE ONCE DAILY      pioglitazone (Actos) 15 mg tablet Take 1 tablet by mouth once daily 90 tablet 3    pyridostigmine (Mestinon) 60 mg tablet Take 1 tablet (60 mg) by mouth 4 times a day. 4 times daily 360 tablet 3    semaglutide 0.25 mg or 0.5 mg (2 mg/3 mL) pen injector Inject 0.5 mg under the skin 1 (one) time per week. 3 mL 0    vit A,C and E-lutein-minerals (Ocuvite with Lutein) 300 mcg-200 mg-27 mg-2 mg tablet Take by mouth.      vitamin B complex tablet Super B complex tabs      pravastatin (Pravachol) 20 mg tablet TAKE 1 TABLET BY MOUTH ONCE DAILY AS DIRECTED (Patient not taking: Reported on 10/17/2024) 90 tablet 3     No current facility-administered medications for this visit.       Review of Systems    REVIEW OF SYSTEMS  GENERAL:  Negative for malaise, significant weight loss, fever  CARDIOVASCULAR: leg swelling   MUSCULOSKELETAL:  Negative for joint pain or swelling, back pain, and muscle pain.  SKIN:  Negative for lesions, rash, and itching  PSYCH:  Negative for sleep disturbance, mood disorder and recent psychosocial stressors  NEURO: Negative, denies any burning, tingling or numbness     Objective:   Vasc: DP and PT pulses are palpable bilateral.  CFT is less than 3 seconds bilateral.  Skin temperature is warm to cool proximal to distal bilateral.      Neuro: Burning pain interdigitally.  Plantar foot has areas of numbness.  Derm: Nails are normal. Skin is supple with normal texture and turgor noted.  Webspaces are clean, dry and intact bilateral.  There are no hyperkeratoses, ulcerations,  verruca or other lesions noted.      Ortho: Muscle strength is 5/5 for all pedal groups tested.  Ankle joint, subtalar joint, 1st MPJ and lesser MPJ ROM is full and without pain or crepitus.  The foot type is rectus bilateral off weight bearing.  There are no structural deformities noted.    Assessment/Plan     Diagnoses and all orders for this visit:  Controlled type 2 diabetes mellitus with diabetic neuropathy, without long-term current use of insulin      Patient was placed in the treatment chair.  He was placed in a well ventilated treatment area.  Nitrile labs were placed as well I donned a facemask and protective glasses.  The feet were examined to detect any superficial skin lesions none were found.  The Qutenza patches x4 were opened.  They were placed on both the dorsal and plantar aspects of bilateral feet.  The feet were then wrapped with Coban and the patient was left for 30 minutes.  With the use of nitrile gloves the Qutenza was then removed after 30 minutes.  A generous amount of cleansing gel was then placed on the treatment area and left on for approximately 5 minutes.  The treatment gel was then removed with a moist sponge.  Patient is advised to go home and shower.  Patient is given postoperative instructions.            Sherrie Aguilera, SUSANA

## 2024-11-15 ENCOUNTER — DOCUMENTATION (OUTPATIENT)
Dept: PHARMACY | Facility: HOSPITAL | Age: 74
End: 2024-11-15
Payer: MEDICARE

## 2024-11-15 NOTE — PROGRESS NOTES
PAP Approval  Gavin Hawkins Jr. is a 74 y.o. male who was referred to the Clinical Pharmacy Team by Salvatore Madera DO.   The patient has been approved for  Patient Assistance Program. Patient has been contacted on the status of the approval. Provided the patient with the [facility] phone number, and made patient aware that they will be hearing from someone at [facility] to set up delivery for the prescriptions. Patient will be contacted each month to set up a delivery date prior to deliver of the medication. Discussed with the patient that if they have not heard from the pharmacy and are in need of medication, they can call the pharmacy to set this up.    Medication(s) Approved: Ozempic  Approval Duration: 11/4/2024 - 11/4/2025    Please reach out to the Clinical Pharmacy Team if there are any further questions.     Continue all meds under the continuation of care with the referring provider and clinical pharmacy team.  Verbal consent to manage patient's drug therapy was obtained from the patient/caregiver. They were informed they may decline to participate or withdraw from participation in pharmacy services at any time.    Ángela Sherman, PharmD  Clinical Pharmacist  [phone]

## 2024-11-26 ENCOUNTER — APPOINTMENT (OUTPATIENT)
Dept: PHARMACY | Facility: HOSPITAL | Age: 74
End: 2024-11-26
Payer: MEDICARE

## 2024-11-26 DIAGNOSIS — E11.40 CONTROLLED TYPE 2 DIABETES MELLITUS WITH DIABETIC NEUROPATHY, WITHOUT LONG-TERM CURRENT USE OF INSULIN: ICD-10-CM

## 2024-11-26 PROCEDURE — RXMED WILLOW AMBULATORY MEDICATION CHARGE

## 2024-11-26 NOTE — PROGRESS NOTES
Clinical Pharmacy Appointment    Patient ID: Gavin Hawkins Jr. is a 74 y.o. male who presents for Diabetes.    Pt is here for Follow Up appointment.     Referring Provider: Salvatore Madera DO  PCP: Salvatore Madera DO   Last visit with PCP: 10/16/24   Next visit with PCP: 1/23/25    Subjective   Drug Interactions  No relevant drug interactions were noted.    Medication System Management  Patient's preferred pharmacy: [facility], [facility] home delivery   Adherence/Organization: No concerns  Affordability/Accessibility:  PAP for ozempic    PMHx: Myasthenia gravis, TRUPTI, obesity, HLD, HTN, GERD, BPH     HPI  DIABETES MELLITUS Type 2:    Known diabetic complications: peripheral neuropathy and obesity.  Hx or FH Hx of MTC/MEN2?: No, Pancreatitis?: No, Gastroparesis?: No, UTI/Yeast Infections?: No    Follows with Endocrinology?: No  Diabetic Eye Exam: Needs completed, last 2023  Monofilament Foot Exam: Following with podiatry     Current diabetic medications include:  Ozempic 0.5mg weekly  Metformin 1000mg BID  Previous medications: Pioglitazone (weight gain, therapy completed), Januvia    Clarifications to above regimen: None  Adverse Effects: None    Glucose Readings:  Glucometer/CGM Type: OneTouch Delica  Patient tests BG 1 times per day    Previous home BG readings: Average -130  Current home BG readings: Average -136     Any episodes of hypoglycemia? No   Did patient treat episode of hypoglycemia appropriately? N/A  Does the patient have a prescription for ready-to-use Glucagon? N/A    Lifestyle:  Diet: Vegetarian, no significant changes   Physical Activity: No significant changes    Risk Reducing Medications:  Statin? Yes, atorvastatin 40mg  ACE-I/ARB? Yes, losartan 100mg    Objective   Allergies   Allergen Reactions    Ciprofloxacin Other     Myasthenia Gravis     Social History     Social History Narrative    Not on file      Medication Review  Current Outpatient Medications    Medication Instructions    amLODIPine (NORVASC) 5 mg, oral, Daily, for blood pressure    ascorbic acid, vitamin C, 500 mg capsule Take by mouth.    atorvastatin (LIPITOR) 40 mg, oral, Daily    DULoxetine (CYMBALTA) 30 mg, oral, Daily, Do not crush or chew.    ketoconazole (NIZOral) 2 % shampoo APPLY 1 APPLICATION TOPICALLY AS BODY WASH 2-3 TIMES A WEEK TO AFFECTED AREA ON CHEST UNTIL CLEAR. MAY REPEAT AS NEEDED FOR FLARES.    losartan-hydrochlorothiazide (Hyzaar) 100-25 mg tablet 1 tablet, oral, Daily    metFORMIN (GLUCOPHAGE) 1,000 mg, oral, 2 times daily (morning and late afternoon)    mirabegron (MYRBETRIQ) 25 mg, oral, Daily    omeprazole (PRILOSEC) 40 mg, oral, Daily before breakfast, Do not crush or chew.    ONETOUCH DELICA LANCETS MISC Fine; Use 1 to check glucose once daily    OneTouch Ultra Test strip USE 1 STRIP TO CHECK GLUCOSE ONCE DAILY    Ozempic 0.5 mg, subcutaneous, Weekly    pioglitazone (ACTOS) 15 mg, oral, Daily    pyridostigmine (MESTINON) 60 mg, oral, 4 times daily, 4 times daily    vit A,C and E-lutein-minerals (Ocuvite with Lutein) 300 mcg-200 mg-27 mg-2 mg tablet Take by mouth.    vitamin B complex tablet Super B complex tabs      Vitals  BP Readings from Last 2 Encounters:   10/16/24 118/70   09/10/24 118/76     BMI Readings from Last 1 Encounters:   10/16/24 28.98 kg/m²      Labs  A1C  Lab Results   Component Value Date    HGBA1C 5.9 08/16/2024    HGBA1C 7.8 (A) 04/05/2024    HGBA1C 8.4 (A) 01/05/2024     BMP  Lab Results   Component Value Date    CALCIUM 9.4 08/28/2024     08/28/2024    K 3.6 08/28/2024    CO2 28 08/28/2024    CL 98 08/28/2024    BUN 17 08/28/2024    CREATININE 1.15 08/28/2024    EGFR 66 08/28/2024     LFTs  Lab Results   Component Value Date    ALT 18 08/28/2024    AST 18 08/28/2024    ALKPHOS 53 08/28/2024    BILITOT 0.9 08/28/2024     FLP  Lab Results   Component Value Date    TRIG 103 08/28/2024    CHOL 152 08/28/2024    LDLF 68 06/26/2023    LDLCALC 82  08/28/2024    HDL 49.6 08/28/2024     Urine Microalbumin  Lab Results   Component Value Date    MICROALBCREA 20.2 10/03/2023     Weight Management  Wt Readings from Last 3 Encounters:   10/16/24 94.3 kg (207 lb 12.8 oz)   09/10/24 98.9 kg (218 lb)   09/05/24 98.9 kg (218 lb)      There is no height or weight on file to calculate BMI.     Assessment/Plan   Problem List Items Addressed This Visit       Controlled diabetes mellitus with diabetic neuropathy   Patient's goal A1c is < 7%.  Is pt at goal? Yes, 5.9%  Patient's SMBGs are stable and almost within range. Increase due to discontinuance of pioglitazone.   Rationale for plan: Due to patient preference, tolerability, and almost in range BG, plan to continue current medications and follow up after holiday season.    Medication Changes:  CONTINUE:  Ozempic 0.5mg weekly  Metformin 1000mg BID    Monitoring and Education:  Counseled patient on relevant MOA, expectations, side effects, duration of therapy, contraindications, administration, and monitoring parameters.  All questions and concerns addressed. Contact pharmacist with any further questions or concerns prior to next appointment.    Clinical Pharmacist follow-up: 2/18/25 at 9AM, Telehealth visit    Continue all meds under the continuation of care with the referring provider and clinical pharmacy team.    Thank you,  Clarice Garcia  Pharmacy Resident    Verbal consent to manage patient's drug therapy was obtained from the patient. They were informed they may decline to participate or withdraw from participation in pharmacy services at any time.

## 2024-11-27 ENCOUNTER — PHARMACY VISIT (OUTPATIENT)
Dept: PHARMACY | Facility: CLINIC | Age: 74
End: 2024-11-27
Payer: COMMERCIAL

## 2024-12-12 ENCOUNTER — APPOINTMENT (OUTPATIENT)
Dept: PODIATRY | Facility: CLINIC | Age: 74
End: 2024-12-12
Payer: MEDICARE

## 2025-01-02 PROCEDURE — RXMED WILLOW AMBULATORY MEDICATION CHARGE

## 2025-01-06 ENCOUNTER — PHARMACY VISIT (OUTPATIENT)
Dept: PHARMACY | Facility: CLINIC | Age: 75
End: 2025-01-06
Payer: COMMERCIAL

## 2025-01-16 ENCOUNTER — TELEPHONE (OUTPATIENT)
Dept: PODIATRY | Facility: CLINIC | Age: 75
End: 2025-01-16

## 2025-01-16 ENCOUNTER — APPOINTMENT (OUTPATIENT)
Dept: PODIATRY | Facility: CLINIC | Age: 75
End: 2025-01-16
Payer: MEDICARE

## 2025-01-16 DIAGNOSIS — G62.9 PERIPHERAL POLYNEUROPATHY: ICD-10-CM

## 2025-01-16 DIAGNOSIS — B35.1 ONYCHOMYCOSIS: ICD-10-CM

## 2025-01-16 DIAGNOSIS — E11.40 CONTROLLED TYPE 2 DIABETES MELLITUS WITH DIABETIC NEUROPATHY, WITHOUT LONG-TERM CURRENT USE OF INSULIN: Primary | ICD-10-CM

## 2025-01-16 DIAGNOSIS — I87.2 VENOUS INSUFFICIENCY: ICD-10-CM

## 2025-01-16 DIAGNOSIS — M79.674 PAIN IN TOES OF BOTH FEET: ICD-10-CM

## 2025-01-16 DIAGNOSIS — M79.675 PAIN IN TOES OF BOTH FEET: ICD-10-CM

## 2025-01-16 PROCEDURE — 99213 OFFICE O/P EST LOW 20 MIN: CPT | Performed by: PODIATRIST

## 2025-01-16 NOTE — PROGRESS NOTES
History Of Present Illness  Gavin Hawkins Jr. is a 74 y.o. male presenting diabetic nail care. Paniful elongated nails.  Patient had treatment done for neuropathy.  He states that Qutenza gave him about 20% relief.  He questions whether he should do it again.    PCP Salvatore Madera DO  Last visit 10/16/24     Past Medical History  He has a past medical history of Muscle weakness (generalized) (05/21/2018), Personal history of diseases of the skin and subcutaneous tissue (02/01/2019), Personal history of other diseases of male genital organs, Personal history of other infectious and parasitic diseases (02/04/2019), and Radiculopathy, cervical region (10/30/2015).    Surgical History  He has a past surgical history that includes Other surgical history (04/28/2022) and Other surgical history (04/24/2018).     Social History  He reports that he has never smoked. He has never used smokeless tobacco. He reports current alcohol use. He reports that he does not use drugs.    Family History  Family History   Problem Relation Name Age of Onset    Cataracts Mother      Other (smoking) Mother      Diabetes Father      Other (cardiac disorder) Father          Allergies  Ciprofloxacin    Medications  Current Outpatient Medications   Medication Sig Dispense Refill    amLODIPine (Norvasc) 10 mg tablet Take 0.5 tablets (5 mg) by mouth once daily. for blood pressure 90 tablet 3    ascorbic acid, vitamin C, 500 mg capsule Take by mouth.      atorvastatin (Lipitor) 40 mg tablet Take 1 tablet (40 mg) by mouth once daily. 90 tablet 3    DULoxetine (Cymbalta) 30 mg DR capsule Take 1 capsule (30 mg) by mouth once daily. Do not crush or chew. 30 capsule 11    ketoconazole (NIZOral) 2 % shampoo APPLY 1 APPLICATION TOPICALLY AS BODY WASH 2-3 TIMES A WEEK TO AFFECTED AREA ON CHEST UNTIL CLEAR. MAY REPEAT AS NEEDED FOR FLARES.      losartan-hydrochlorothiazide (Hyzaar) 100-25 mg tablet Take 1 tablet by mouth once daily. 90 tablet 3     metFORMIN (Glucophage) 1,000 mg tablet TAKE 1 TABLET BY MOUTH TWICE DAILY WITH MEALS 180 tablet 3    Myrbetriq 25 mg tablet extended release 24 hr 24 hr tablet Take 1 tablet by mouth once daily 30 tablet 0    omeprazole (PriLOSEC) 40 mg DR capsule Take 1 capsule (40 mg) by mouth once daily in the morning. Take before meals. Do not crush or chew. 90 capsule 3    ONETOUCH DELICA LANCETS MISC Fine; Use 1 to check glucose once daily      OneTouch Ultra Test strip USE 1 STRIP TO CHECK GLUCOSE ONCE DAILY      pioglitazone (Actos) 15 mg tablet Take 1 tablet by mouth once daily 90 tablet 3    pyridostigmine (Mestinon) 60 mg tablet Take 1 tablet (60 mg) by mouth 4 times a day. 4 times daily 360 tablet 3    semaglutide 0.25 mg or 0.5 mg (2 mg/3 mL) pen injector Inject 0.5 mg under the skin 1 (one) time per week. 3 mL 2    vit A,C and E-lutein-minerals (Ocuvite with Lutein) 300 mcg-200 mg-27 mg-2 mg tablet Take by mouth.      vitamin B complex tablet Super B complex tabs       No current facility-administered medications for this visit.       Review of Systems    REVIEW OF SYSTEMS  GENERAL:  Negative for malaise, significant weight loss, fever  CARDIOVASCULAR: leg swelling   MUSCULOSKELETAL:  Negative for joint pain or swelling, back pain, and muscle pain.  SKIN:  Negative for lesions, rash, and itching  PSYCH:  Negative for sleep disturbance, mood disorder and recent psychosocial stressors  NEURO: Negative, denies any burning, tingling or numbness     Objective:   Vasc: DP and PT pulses are palpable bilateral.  CFT is less than 3 seconds bilateral.  Skin temperature is warm to cool proximal to distal bilateral.      Neuro: Burning pain interdigitally.  Plantar foot has areas of numbness.  Derm:  Nails are thickened, elongated and crumbly with subungual debris   Skin is supple with normal texture and turgor noted.  Webspaces are clean, dry and intact bilateral.  There are no hyperkeratoses, ulcerations, verruca or other  lesions noted.      Ortho: Muscle strength is 5/5 for all pedal groups tested.  Ankle joint, subtalar joint, 1st MPJ and lesser MPJ ROM is full and without pain or crepitus.  The foot type is rectus bilateral off weight bearing.  There are no structural deformities noted.    Assessment/Plan   Painful nail mycosis  Toenails are debrided in length and thickness to avoid infection and for pain relief    Diabetic neuropathy-I explained to patient that Quentenza can be cumulative.  We will try to pre-CERT to see if we can get another dose covered.

## 2025-01-16 NOTE — TELEPHONE ENCOUNTER
[facility] [id number], [place] [address] calling to refill Quteza patch. Phone: [phone]

## 2025-01-23 ENCOUNTER — APPOINTMENT (OUTPATIENT)
Dept: PRIMARY CARE | Facility: CLINIC | Age: 75
End: 2025-01-23
Payer: MEDICARE

## 2025-01-23 VITALS
OXYGEN SATURATION: 98 % | SYSTOLIC BLOOD PRESSURE: 139 MMHG | BODY MASS INDEX: 30.66 KG/M2 | HEIGHT: 71 IN | DIASTOLIC BLOOD PRESSURE: 75 MMHG | HEART RATE: 76 BPM | RESPIRATION RATE: 18 BRPM | WEIGHT: 219 LBS | TEMPERATURE: 97 F

## 2025-01-23 DIAGNOSIS — K59.00 CONSTIPATION, UNSPECIFIED CONSTIPATION TYPE: ICD-10-CM

## 2025-01-23 DIAGNOSIS — G70.00 MYASTHENIA GRAVIS: ICD-10-CM

## 2025-01-23 DIAGNOSIS — E11.40 CONTROLLED TYPE 2 DIABETES MELLITUS WITH DIABETIC NEUROPATHY, WITHOUT LONG-TERM CURRENT USE OF INSULIN: Primary | ICD-10-CM

## 2025-01-23 DIAGNOSIS — R14.0 FUNCTIONAL BLOATING: ICD-10-CM

## 2025-01-23 DIAGNOSIS — E11.42 DIABETIC POLYNEUROPATHY ASSOCIATED WITH TYPE 2 DIABETES MELLITUS (MULTI): ICD-10-CM

## 2025-01-23 DIAGNOSIS — C43.9 MALIGNANT MELANOMA, UNSPECIFIED SITE (MULTI): ICD-10-CM

## 2025-01-23 PROBLEM — L03.019 PARONYCHIA OF FINGER: Status: ACTIVE | Noted: 2022-12-15

## 2025-01-23 LAB — POC HEMOGLOBIN A1C: 5.8 % (ref 4.2–6.5)

## 2025-01-23 PROCEDURE — 99214 OFFICE O/P EST MOD 30 MIN: CPT | Performed by: INTERNAL MEDICINE

## 2025-01-23 PROCEDURE — 3075F SYST BP GE 130 - 139MM HG: CPT | Performed by: INTERNAL MEDICINE

## 2025-01-23 PROCEDURE — G2211 COMPLEX E/M VISIT ADD ON: HCPCS | Performed by: INTERNAL MEDICINE

## 2025-01-23 PROCEDURE — 1036F TOBACCO NON-USER: CPT | Performed by: INTERNAL MEDICINE

## 2025-01-23 PROCEDURE — 83036 HEMOGLOBIN GLYCOSYLATED A1C: CPT | Performed by: INTERNAL MEDICINE

## 2025-01-23 PROCEDURE — 3078F DIAST BP <80 MM HG: CPT | Performed by: INTERNAL MEDICINE

## 2025-01-23 PROCEDURE — 1159F MED LIST DOCD IN RCRD: CPT | Performed by: INTERNAL MEDICINE

## 2025-01-23 PROCEDURE — 1123F ACP DISCUSS/DSCN MKR DOCD: CPT | Performed by: INTERNAL MEDICINE

## 2025-01-23 PROCEDURE — 1157F ADVNC CARE PLAN IN RCRD: CPT | Performed by: INTERNAL MEDICINE

## 2025-01-23 RX ORDER — METHYLCELLULOSE (WITH SUGAR)
1 POWDER IN PACKET (EA) ORAL DAILY
Qty: 454 G | Refills: 3 | Status: SHIPPED | OUTPATIENT
Start: 2025-01-23 | End: 2026-01-23

## 2025-01-23 RX ORDER — OMEGA-3/DHA/EPA/FISH OIL 1000 MG
1 CAPSULE ORAL DAILY
Qty: 90 CAPSULE | Refills: 3 | Status: SHIPPED | OUTPATIENT
Start: 2025-01-23 | End: 2026-01-23

## 2025-01-23 ASSESSMENT — ENCOUNTER SYMPTOMS
FEVER: 0
TROUBLE SWALLOWING: 0
CHILLS: 0
FREQUENCY: 0
SORE THROAT: 0
PALPITATIONS: 0
SHORTNESS OF BREATH: 0
ABDOMINAL PAIN: 0
NUMBNESS: 1
DYSURIA: 0

## 2025-01-23 NOTE — ASSESSMENT & PLAN NOTE
Trial of Qtenza but no relief  Bloating and GI side effects and will try to wean pioglitazone and increase semaglutide.   Will increase semaglutide and consider increasing further as needed.    Cholesterol near target for diabetes but will wait until we see the results of increase semaglutide.    Diabetic neuropathy tolerated without pain and notes only numbness, and did not like the cost of topical therapy.    Orders:    POCT glycosylated hemoglobin (Hb A1C) manually resulted    semaglutide (OZEMPIC) 1 mg/dose (4 mg/3 mL) pen injector; Inject 1 mg under the skin 1 (one) time per week.    CBC; Future    Comprehensive Metabolic Panel; Future    Lipid Panel; Future    Magnesium; Future    Microscopic Only, Urine; Future    Albumin-Creatinine Ratio, Urine Random; Future    Follow Up In [facility] - PCP - Established; Future

## 2025-01-23 NOTE — PROGRESS NOTES
"Subjective   Gavin Hawkins Jr. is a 74 y.o. male who presents for Follow-up.      Diabetes  He presents for his follow-up diabetic visit. He has type 2 diabetes mellitus. His disease course has been stable. Pertinent negatives for diabetes include no chest pain. Symptoms are stable. There are no diabetic complications. Risk factors for coronary artery disease include diabetes mellitus and dyslipidemia. His weight is stable. He has not had a previous visit with a dietitian. He rarely participates in exercise. He monitors blood glucose at home 1-2 x per day. Blood glucose monitoring compliance is good. His home blood glucose trend is decreasing steadily (120'S TO 'S). His breakfast blood glucose is taken between 5-6 am. His breakfast blood glucose range is generally  mg/dl. An ACE inhibitor/angiotensin II receptor blocker is being taken. He sees a podiatrist.Eye exam is current.       Review of Systems   Constitutional:  Negative for chills and fever.   HENT:  Negative for sore throat and trouble swallowing.    Respiratory:  Negative for shortness of breath.    Cardiovascular:  Negative for chest pain, palpitations and leg swelling.   Gastrointestinal:  Negative for abdominal pain.   Genitourinary:  Negative for dysuria and frequency.   Skin:  Negative for rash.   Neurological:  Positive for numbness.       Objective   /75   Pulse 76   Temp 36.1 °C (97 °F)   Resp 18   Ht 1.803 m (5' 11\")   Wt 99.3 kg (219 lb)   SpO2 98%   BMI 30.54 kg/m²       Physical Exam  Constitutional:       Appearance: Normal appearance.   HENT:      Head: Normocephalic.   Eyes:      Conjunctiva/sclera: Conjunctivae normal.   Cardiovascular:      Rate and Rhythm: Normal rate and regular rhythm.      Heart sounds: Normal heart sounds.   Pulmonary:      Effort: Pulmonary effort is normal.      Breath sounds: Normal breath sounds.   Musculoskeletal:      Cervical back: Neck supple.   Skin:     General: Skin is warm and " dry.   Neurological:      Mental Status: He is alert.       Assessment & Plan  Controlled type 2 diabetes mellitus with diabetic neuropathy, without long-term current use of insulin  Trial of Qtenza but no relief  Bloating and GI side effects and will try to wean pioglitazone and increase semaglutide.   Will increase semaglutide and consider increasing further as needed.    Cholesterol near target for diabetes but will wait until we see the results of increase semaglutide.    Diabetic neuropathy tolerated without pain and notes only numbness, and did not like the cost of topical therapy.    Orders:  •  POCT glycosylated hemoglobin (Hb A1C) manually resulted  •  semaglutide (OZEMPIC) 1 mg/dose (4 mg/3 mL) pen injector; Inject 1 mg under the skin 1 (one) time per week.  •  CBC; Future  •  Comprehensive Metabolic Panel; Future  •  Lipid Panel; Future  •  Magnesium; Future  •  Microscopic Only, Urine; Future  •  Albumin-Creatinine Ratio, Urine Random; Future  •  Follow Up In [facility] - PCP - Established; Future    Constipation, unspecified constipation type    Orders:  •  L.acidoph-L.rhamn-B.bif-B.long (Probiotic Acidophilus Biobeads) 12.9 mg (2 billion cell) tablet,delayed release (DR/EC); Take 1 capsule by mouth once daily.  •  methylcellulose (CitruceL, sucrose,) oral powder; Take 1 packet by mouth once daily.    Functional bloating    Orders:  •  L.acidoph-L.rhamn-B.bif-B.long (Probiotic Acidophilus Biobeads) 12.9 mg (2 billion cell) tablet,delayed release (DR/EC); Take 1 capsule by mouth once daily.  •  methylcellulose (CitruceL, sucrose,) oral powder; Take 1 packet by mouth once daily.    Myasthenia gravis  Continue mestinon       Malignant melanoma, unspecified site (Multi)  Follows with dermatology       Diabetic polyneuropathy associated with type 2 diabetes mellitus (Multi)          Follow up in 3 months or sooner should symptoms worsen.       Salvatore Madera DO

## 2025-02-03 DIAGNOSIS — E11.40 CONTROLLED TYPE 2 DIABETES MELLITUS WITH DIABETIC NEUROPATHY, WITHOUT LONG-TERM CURRENT USE OF INSULIN: ICD-10-CM

## 2025-02-03 PROCEDURE — RXMED WILLOW AMBULATORY MEDICATION CHARGE

## 2025-02-05 ENCOUNTER — PHARMACY VISIT (OUTPATIENT)
Dept: PHARMACY | Facility: CLINIC | Age: 75
End: 2025-02-05
Payer: COMMERCIAL

## 2025-02-08 DIAGNOSIS — R39.15 URINARY URGENCY: ICD-10-CM

## 2025-02-10 RX ORDER — MIRABEGRON 25 MG/1
25 TABLET, FILM COATED, EXTENDED RELEASE ORAL DAILY
Qty: 30 TABLET | Refills: 3 | Status: SHIPPED | OUTPATIENT
Start: 2025-02-10

## 2025-02-18 ENCOUNTER — APPOINTMENT (OUTPATIENT)
Dept: PHARMACY | Facility: HOSPITAL | Age: 75
End: 2025-02-18
Payer: MEDICARE

## 2025-02-18 DIAGNOSIS — E11.40 CONTROLLED TYPE 2 DIABETES MELLITUS WITH DIABETIC NEUROPATHY, WITHOUT LONG-TERM CURRENT USE OF INSULIN: ICD-10-CM

## 2025-02-18 DIAGNOSIS — N39.41 URGE INCONTINENCE OF URINE: Primary | ICD-10-CM

## 2025-02-18 RX ORDER — MIRABEGRON 25 MG/1
25 TABLET, FILM COATED, EXTENDED RELEASE ORAL DAILY
Qty: 90 TABLET | Refills: 3 | Status: SHIPPED | OUTPATIENT
Start: 2025-02-18

## 2025-02-21 ENCOUNTER — TELEPHONE (OUTPATIENT)
Dept: PRIMARY CARE | Facility: CLINIC | Age: 75
End: 2025-02-21
Payer: MEDICARE

## 2025-02-21 DIAGNOSIS — I10 PRIMARY HYPERTENSION: ICD-10-CM

## 2025-02-21 RX ORDER — AMLODIPINE BESYLATE 5 MG/1
5 TABLET ORAL DAILY
Qty: 90 TABLET | Refills: 3 | Status: SHIPPED | OUTPATIENT
Start: 2025-02-21

## 2025-02-21 NOTE — TELEPHONE ENCOUNTER
Refill request for the following    amLODIPine (Norvasc)      Patient is to take 5 MG. (He no longer wants to cut the tablets in half.)    Please send the script for the 5MG tablet  (not 10 as he had in the past)    Please send the new RX to [facility].

## 2025-03-04 PROCEDURE — RXMED WILLOW AMBULATORY MEDICATION CHARGE

## 2025-03-06 ENCOUNTER — PHARMACY VISIT (OUTPATIENT)
Dept: PHARMACY | Facility: CLINIC | Age: 75
End: 2025-03-06
Payer: COMMERCIAL

## 2025-03-06 PROCEDURE — RXMED WILLOW AMBULATORY MEDICATION CHARGE

## 2025-03-07 ENCOUNTER — PHARMACY VISIT (OUTPATIENT)
Dept: PHARMACY | Facility: CLINIC | Age: 75
End: 2025-03-07
Payer: COMMERCIAL

## 2025-03-18 ENCOUNTER — APPOINTMENT (OUTPATIENT)
Dept: PHARMACY | Facility: HOSPITAL | Age: 75
End: 2025-03-18
Payer: MEDICARE

## 2025-03-19 ENCOUNTER — APPOINTMENT (OUTPATIENT)
Dept: DERMATOLOGY | Facility: CLINIC | Age: 75
End: 2025-03-19
Payer: MEDICARE

## 2025-03-27 ENCOUNTER — APPOINTMENT (OUTPATIENT)
Dept: DERMATOLOGY | Facility: CLINIC | Age: 75
End: 2025-03-27
Payer: MEDICARE

## 2025-03-27 DIAGNOSIS — L82.1 SEBORRHEIC KERATOSIS: ICD-10-CM

## 2025-03-27 DIAGNOSIS — D48.5 NEOPLASM OF UNCERTAIN BEHAVIOR OF SKIN: ICD-10-CM

## 2025-03-27 DIAGNOSIS — D22.9 MULTIPLE BENIGN NEVI: ICD-10-CM

## 2025-03-27 DIAGNOSIS — L73.8 SEBACEOUS HYPERPLASIA OF FACE: ICD-10-CM

## 2025-03-27 DIAGNOSIS — L81.4 LENTIGO: ICD-10-CM

## 2025-03-27 DIAGNOSIS — L57.0 ACTINIC KERATOSIS: ICD-10-CM

## 2025-03-27 DIAGNOSIS — Z85.828 PERSONAL HISTORY OF SKIN CANCER: ICD-10-CM

## 2025-03-27 DIAGNOSIS — D18.01 HEMANGIOMA OF SKIN: Primary | ICD-10-CM

## 2025-03-27 PROCEDURE — 11102 TANGNTL BX SKIN SINGLE LES: CPT | Performed by: STUDENT IN AN ORGANIZED HEALTH CARE EDUCATION/TRAINING PROGRAM

## 2025-03-27 PROCEDURE — 17003 DESTRUCT PREMALG LES 2-14: CPT | Performed by: STUDENT IN AN ORGANIZED HEALTH CARE EDUCATION/TRAINING PROGRAM

## 2025-03-27 PROCEDURE — 17000 DESTRUCT PREMALG LESION: CPT | Performed by: STUDENT IN AN ORGANIZED HEALTH CARE EDUCATION/TRAINING PROGRAM

## 2025-03-27 PROCEDURE — 1157F ADVNC CARE PLAN IN RCRD: CPT | Performed by: STUDENT IN AN ORGANIZED HEALTH CARE EDUCATION/TRAINING PROGRAM

## 2025-03-27 PROCEDURE — 1123F ACP DISCUSS/DSCN MKR DOCD: CPT | Performed by: STUDENT IN AN ORGANIZED HEALTH CARE EDUCATION/TRAINING PROGRAM

## 2025-03-27 PROCEDURE — 1159F MED LIST DOCD IN RCRD: CPT | Performed by: STUDENT IN AN ORGANIZED HEALTH CARE EDUCATION/TRAINING PROGRAM

## 2025-03-27 PROCEDURE — 99213 OFFICE O/P EST LOW 20 MIN: CPT | Performed by: STUDENT IN AN ORGANIZED HEALTH CARE EDUCATION/TRAINING PROGRAM

## 2025-03-27 NOTE — PATIENT INSTRUCTIONS
"WHAT TO LOOK FOR: ABCDES OF MELANOMA:    A is for Asymmetry  One half of the spot is unlike the other half.    B is for Border  The spot has an irregular, scalloped, or poorly defined border.    C is for Color  The spot has varying colors from one area to the next, such as shades of tan, brown or black, or areas of white, red, or blue.    D is for Diameter  While melanomas are usually greater than 6 millimeters, or about the size of a pencil eraser, when diagnosed, they can be smaller.    E is for Evolving  The spot looks different from the rest or is changing in size, shape, or color.    SUNSCREEN AND SUN PROTECTION    Ultraviolet radiation from the sun is the main cause of skin cancer as well as sun damage (brown spots, wrinkles and more).  Your best protection from the sun is to stay out of the mid-day sun (from 10am-3pm), seek shade, and cover your skin with clothing and hats.  Wear a swim shirt when swimming.  Sunscreen should be used to areas that aren't covered, including lips.    We prefer sunscreens that are SPF 30 or higher.  Sunscreens should be applied liberally and reapplied every 2 hours, more often when swimming or sweating.    If you will be sweating or swimming, choose a sunscreen that is labeled \"Water resistant 80 minutes\".  This is the highest waterproof rating from the FDA.      Use a moisturizer with sunscreen daily to protect your sun-exposed areas such as the face, neck and backs of hands.  Some drugstore brands to try are Neutrogena Healthy Defense Daily Moisturizer (PureScreen) SPF 50 or CeraVe Face Lotion Invisible Zinc SPF 50.  Elta MD products are slightly more expensive and must be ordered through Amazon or the Elta website.  We like their UV Daily or UV Clear.      For body sunscreen when doing outdoor activity, some to try include Sun Bum products, Aveeno Baby Continuous Protection SPF 50 for sensitive skin, Blue Lizard SPF 30+, All Good sport sunscreen SPF 50, or Banana Boat Simply " Protect Sport Sunscreen lotion spf 50.  Sticks, gels, and sprays are also great and can be used for areas of the body that are difficult to cover with lotion.    If you have brown spots such as melasma or lentigenes, choose a tinted sunscreen.  There are ingredients in tinted sunscreens (iron oxide) that do a better job blocking certain types of light that cause brown spots.  We like Elta MD UV Clear tinted or Elta MD UV Daily tinted, which can be ordered on [employer] or from [employer]. You can also try Coola Mineral Face Matte Moisturizer SPF 30 or Australian Gold Botaniacal Suncreen SPF 50 Tinted Face Mineral Lotion.    There are two types of sunscreens: Chemical sunscreens, such as those that contain the ingredients avobenzone and oxybenzone, and Physical sunscreens, such as those that contain Zinc oxide and Titanium dioxide. Chemical sunscreens absorb light and absorb into the skin.  They must be applied 15 minutes before sun exposure.  Physical sunscreens reflect the light and are not absorbed into the skin.  They should be applied 5 minutes before sun exposure.  Some patients worry about the effects of sunscreens that are absorbed into the skin.  If you are worried about this, use the physical (zinc/titanium sunscreens)- look at the label before buying.  There is lots of scientific evidence that sunlight causes cancer, but there is no direct evidence that sunscreens are harmful.  However, the FDA has asked for further study of the chemical sunscreens to make sure they do not have any health effects on humans.

## 2025-03-27 NOTE — PROGRESS NOTES
Subjective   Gavin Hawkins Jr. is a 74 y.o. male who presents for the following: Skin Check (LV: 8/28/24: FBSE.) and Suspicious Skin Lesion (Lesion on forehead that he believes has been treated with LN2 in the past and is not changing.)    Skin Cancer History  Mildly dysplastic nevus - mid upper abdomen s/p excision 10/17/24  Moderately dysplastic nevus - left lower back s/p excision 3/14/23  SCC - right superior scalp s/p Mohs 9/21/21  Mildly dysplastic nevus - left posterior shoulder s/p excision 9/25/20  Melanoma - back s/p excision 2005  AK's     Family History of Skin Cancer  None    The following portions of the chart were reviewed this encounter and updated as appropriate:         Review of Systems: Pertinent items are noted in HPI.    Objective   Well appearing patient in no apparent distress; mood and affect are within normal limits.    A full examination was performed including scalp, head, eyes, ears, nose, lips, neck, chest, axillae, abdomen, back, buttocks, bilateral upper extremities, bilateral lower extremities, hands, feet, fingers, toes, fingernails, and toenails. All findings within normal limits unless otherwise noted below.    Scattered cherry-red papule(s).    Well healed scar(s) at site(s) of prior treatment    Scattered tan macules in sun-exposed areas.    Stuck on verrucous, tan-brown papules and plaques.      Scattered, uniform and benign-appearing, regular brown melanocytic papules and macules.    Generalized, Left Malar Cheek  Small yellow, lobulated papules with a central dell.    Left Upper Back, Mid Back  Erythematous macules with gritty scale.    Mid Forehead  Erythematous scaly papule.            Assessment/Plan   Hemangioma of skin    Reassured of benign nature of lesions    Personal history of skin cancer    No evidence of recurrence at site(s) of prior treatment  Continue photoprotection with sun-protective clothing and sunscreen SPF 30+ daily  Continue routine self-skin  examination  Continue to follow up every 6 months for routine FBSE or earlier prn for new/changing/concerning lesions       Related Procedures  Follow Up In Dermatology - Established Patient  Follow Up In Dermatology - Established Patient    Lentigo    Reassured of benign nature of lesions    Seborrheic keratosis    Reassured of benign nature of lesions    Multiple benign nevi    Reassured of benign nature of lesions    Sebaceous hyperplasia of face (2)  Left Malar Cheek; Generalized    Reassured of benign nature of lesions    Actinic keratosis (2)  Left Upper Back; Mid Back    Discussed precancerous nature of condition and relationship with sun-exposure.   Recommended treatment today with LN2. Discussed r/b of procedure including risk of pain, temporary redness, and dyspigmentation. Patient verbalized understanding and agreement with plan for treatment today.    Destr of lesion - Left Upper Back, Mid Back  Complexity: simple    Destruction method: cryotherapy    Informed consent: discussed and consent obtained    Lesion destroyed using liquid nitrogen: Yes    Region frozen until ice ball extended beyond lesion: Yes    Cryotherapy cycles:  1  Outcome: patient tolerated procedure well with no complications    Post-procedure details: wound care instructions given      Neoplasm of uncertain behavior of skin  Mid Forehead    Lesion biopsy  Type of biopsy: tangential    Informed consent: discussed and consent obtained    Timeout: patient name, date of birth, surgical site, and procedure verified    Procedure prep:  Patient was prepped and draped  Anesthesia: the lesion was anesthetized in a standard fashion    Anesthetic:  1% lidocaine w/ epinephrine 1-100,000 local infiltration  Instrument used: DermaBlade    Hemostasis achieved with: aluminum chloride    Outcome: patient tolerated procedure well    Post-procedure details: sterile dressing applied and wound care instructions given    Dressing type: petrolatum and  bandage      Staff Communication: Dermatology Local Anesthesia: 1 % Lidocaine / Epinephrine - Amount: 0.2cc    Specimen 1 - Dermatopathology- DERM LAB  Differential Diagnosis: AK vs SCC  Check Margins Yes/No?:    Comments:    Dermpath Lab: Routine Histopathology (formalin-fixed tissue)    -Discussed uncertain diagnosis of lesion and recommended biopsy to help clarify diagnosis. -Discussed R/B of procedure including risk of scar. Patient verbalized understanding and agreement with plan for biopsy today.    Continue to follow up every 6 months for routine FBSE or earlier prn for new/changing/concerning lesions     Scribe Attestation  By signing my name below, IDeirdre LPN , Scribe   attest that this documentation has been prepared under the direction and in the presence of Moe Worley MD.

## 2025-03-29 LAB
ALBUMIN SERPL-MCNC: 4.3 G/DL (ref 3.6–5.1)
ALP SERPL-CCNC: 70 U/L (ref 35–144)
ALT SERPL-CCNC: 15 U/L (ref 9–46)
ANION GAP SERPL CALCULATED.4IONS-SCNC: 12 MMOL/L (CALC) (ref 7–17)
AST SERPL-CCNC: 14 U/L (ref 10–35)
BILIRUB SERPL-MCNC: 0.6 MG/DL (ref 0.2–1.2)
BUN SERPL-MCNC: 17 MG/DL (ref 7–25)
CALCIUM SERPL-MCNC: 9.8 MG/DL (ref 8.6–10.3)
CHLORIDE SERPL-SCNC: 101 MMOL/L (ref 98–110)
CHOLEST SERPL-MCNC: 106 MG/DL
CHOLEST/HDLC SERPL: 2 (CALC)
CO2 SERPL-SCNC: 27 MMOL/L (ref 20–32)
CREAT SERPL-MCNC: 0.87 MG/DL (ref 0.7–1.28)
EGFRCR SERPLBLD CKD-EPI 2021: 90 ML/MIN/1.73M2
ERYTHROCYTE [DISTWIDTH] IN BLOOD BY AUTOMATED COUNT: 13.5 % (ref 11–15)
GLUCOSE SERPL-MCNC: 105 MG/DL (ref 65–99)
HCT VFR BLD AUTO: 38.4 % (ref 38.5–50)
HDLC SERPL-MCNC: 53 MG/DL
HGB BLD-MCNC: 12.6 G/DL (ref 13.2–17.1)
LDLC SERPL CALC-MCNC: 35 MG/DL (CALC)
MAGNESIUM SERPL-MCNC: 1.8 MG/DL (ref 1.5–2.5)
MCH RBC QN AUTO: 28.8 PG (ref 27–33)
MCHC RBC AUTO-ENTMCNC: 32.8 G/DL (ref 32–36)
MCV RBC AUTO: 87.7 FL (ref 80–100)
NONHDLC SERPL-MCNC: 53 MG/DL (CALC)
PLATELET # BLD AUTO: 196 THOUSAND/UL (ref 140–400)
PMV BLD REES-ECKER: 10.4 FL (ref 7.5–12.5)
POTASSIUM SERPL-SCNC: 4.2 MMOL/L (ref 3.5–5.3)
PROT SERPL-MCNC: 6.7 G/DL (ref 6.1–8.1)
RBC # BLD AUTO: 4.38 MILLION/UL (ref 4.2–5.8)
SODIUM SERPL-SCNC: 140 MMOL/L (ref 135–146)
TRIGL SERPL-MCNC: 93 MG/DL
WBC # BLD AUTO: 9.3 THOUSAND/UL (ref 3.8–10.8)

## 2025-03-31 ENCOUNTER — APPOINTMENT (OUTPATIENT)
Dept: PHARMACY | Facility: HOSPITAL | Age: 75
End: 2025-03-31
Payer: MEDICARE

## 2025-03-31 DIAGNOSIS — E11.40 CONTROLLED TYPE 2 DIABETES MELLITUS WITH DIABETIC NEUROPATHY, WITHOUT LONG-TERM CURRENT USE OF INSULIN: Primary | ICD-10-CM

## 2025-03-31 NOTE — PROGRESS NOTES
Clinical Pharmacy Appointment    Patient ID: Gavin Hawkins Jr. is a 74 y.o. male who presents for Diabetes.    Pt is here for Follow Up appointment.     Referring Provider: Salvatore Madera DO  PCP: Salvatore Madera DO   Last visit with PCP: 1/23/25   Next visit with PCP: 4/25/25    Subjective   Drug Interactions  No relevant drug interactions were noted.     Medication System Management  Patient's preferred pharmacy: [facility],  [facility] home delivery   Adherence/Organization: No concerns  Affordability/Accessibility:  PAP for ozempic      Patient Assistance for Ozempic and Myrbetriq approved through 11/4/25. Will have to be renewed prior to that date to prevent lapse in coverage. Medication(s) will be received at no cost to patient from [facility].      PMHx: Myasthenia gravis, TRUPTI, obesity, HLD, HTN, GERD, BPH     HPI  DIABETES MELLITUS Type 2:    Follows with Endocrinology?: No    Pertinent PMH Review  Known diabetic complications:   Peripheral neuropathy  Obesity  Hx or FH Hx of MTC/MEN2?: No  Pancreatitis?: No  Gastroparesis?: No  UTI/Yeast Infections?: No    Pharmacological Therapy  Current Medications:   Ozempic 1mg weekly  Metformin 1000mg BID  Previous Medications: Pioglitazone (weight gain, therapy completed), Januvia       Clarifications to above regimen: Had 2 boxes of Ozmepic delivered, not one  Adverse Effects: None noted    Glucose Readings  Glucometer/CGM Type: OneTouch Delica  Patient tests BG 1 times per day    Previous home BG readings: Averages around 100-110's  Current home BG readings: FBG averaging <115, sometimes around 101     Any episodes of hypoglycemia? No   Did patient treat episode of hypoglycemia appropriately? N/A  Does the patient have a prescription for ready-to-use Glucagon? No,      Lifestyle  Diet: No significant changes  Physical Activity: No significant changes    Risk Reducing Medications  Statin? Yes, atorvastatin 40mg  ACE-I/ARB? Yes, losartan  100mg    Preventative Care  Diabetic Eye Exam: Needs completed, last 2023  Monofilament Foot Exam: Following with podiatry    Objective   Allergies   Allergen Reactions    Ciprofloxacin Other     Myasthenia Gravis     Social History     Social History Narrative    Not on file      Medication Review  Current Outpatient Medications   Medication Instructions    amLODIPine (NORVASC) 5 mg, oral, Daily, for blood pressure    ascorbic acid, vitamin C, 500 mg capsule Take by mouth.    atorvastatin (LIPITOR) 40 mg, oral, Daily    DULoxetine (CYMBALTA) 30 mg, oral, Daily, Do not crush or chew.    ketoconazole (NIZOral) 2 % shampoo APPLY 1 APPLICATION TOPICALLY AS BODY WASH 2-3 TIMES A WEEK TO AFFECTED AREA ON CHEST UNTIL CLEAR. MAY REPEAT AS NEEDED FOR FLARES.    L.acidoph-L.rhamn-B.bif-B.long (Probiotic Acidophilus Biobeads) 12.9 mg (2 billion cell) tablet,delayed release (DR/EC) 1 capsule, oral, Daily    losartan-hydrochlorothiazide (Hyzaar) 100-25 mg tablet 1 tablet, oral, Daily    metFORMIN (GLUCOPHAGE) 1,000 mg, oral, 2 times daily (morning and late afternoon)    methylcellulose (CitruceL, sucrose,) oral powder 1 packet, oral, Daily    Myrbetriq 25 mg, oral, Daily    omeprazole (PRILOSEC) 40 mg, oral, Daily before breakfast, Do not crush or chew.    ONETOUCH DELICA LANCETS MISC Fine; Use 1 to check glucose once daily    OneTouch Ultra Test strip USE 1 STRIP TO CHECK GLUCOSE ONCE DAILY    Ozempic 1 mg, subcutaneous, Weekly    pyridostigmine (MESTINON) 60 mg, oral, 4 times daily, 4 times daily    vit A,C and E-lutein-minerals (Ocuvite with Lutein) 300 mcg-200 mg-27 mg-2 mg tablet Take by mouth.    vitamin B complex tablet Super B complex tabs      Vitals  BP Readings from Last 2 Encounters:   01/23/25 139/75   10/16/24 118/70     BMI Readings from Last 1 Encounters:   01/23/25 30.54 kg/m²      Labs  A1C  Lab Results   Component Value Date    HGBA1C 5.8 01/23/2025    HGBA1C 5.9 08/16/2024    HGBA1C 7.8 (A) 04/05/2024      BMP  Lab Results   Component Value Date    CALCIUM 9.8 03/28/2025     03/28/2025    K 4.2 03/28/2025    CO2 27 03/28/2025     03/28/2025    BUN 17 03/28/2025    CREATININE 0.87 03/28/2025    EGFR 90 03/28/2025     LFTs  Lab Results   Component Value Date    ALT 15 03/28/2025    AST 14 03/28/2025    ALKPHOS 70 03/28/2025    BILITOT 0.6 03/28/2025     FLP  Lab Results   Component Value Date    TRIG 93 03/28/2025    CHOL 106 03/28/2025    LDLF 68 06/26/2023    LDLCALC 35 03/28/2025    HDL 53 03/28/2025     Urine Microalbumin  Lab Results   Component Value Date    MICROALBCREA 20.2 10/03/2023     Weight Management  Wt Readings from Last 3 Encounters:   01/23/25 99.3 kg (219 lb)   10/16/24 94.3 kg (207 lb 12.8 oz)   09/10/24 98.9 kg (218 lb)      There is no height or weight on file to calculate BMI.     Assessment/Plan   Problem List Items Addressed This Visit       Controlled diabetes mellitus with diabetic neuropathy - Primary    Relevant Orders    Referral to Clinical Pharmacy   Patient's goal A1c is < 7%.  Is pt at goal? Yes, 5.9%  Patient's SMBGs are stable, within range.     Rationale for plan: Due to patient preference and tolerability, plan to continue Ozempic. Due to lack of hyperglycemia, plan to decrease metformin and follow up in 1 month to assess BG. Patient is still tolerating Myrbetriq and notes continual efficacy.     Medication Changes:  CONTINUE  Myrbetriq ER 25mg once daily  Ozempic 1mg weekly  DECREASE  Metformin 1000mg once daily    Monitoring and Education:  Counseled patient on relevant medication mechanisms of action, expectations, side effects, duration of therapy, contraindications, administration, and monitoring parameters.  All questions and concerns addressed. Contact pharmacist with any further questions or concerns prior to next appointment.  Reviewed BG goals: Fasting BG goal , Postprandial BG goal <180 mg/dL, A1C goal <7%    Clinical Pharmacist follow-up: 4/28/25 at  10AM, Telehealth visit    Continue all meds under the continuation of care with the referring provider and clinical pharmacy team.    Thank you,  Clarice Garcia  Pharmacy Resident    Verbal consent to manage patient's drug therapy was obtained from the patient. They were informed they may decline to participate or withdraw from participation in pharmacy services at any time.

## 2025-04-02 DIAGNOSIS — D09.9 SQUAMOUS CELL CARCINOMA IN SITU (SCCIS): ICD-10-CM

## 2025-04-16 ENCOUNTER — HOSPITAL ENCOUNTER (OUTPATIENT)
Dept: RADIOLOGY | Facility: HOSPITAL | Age: 75
Discharge: HOME | End: 2025-04-16
Payer: MEDICARE

## 2025-04-16 DIAGNOSIS — R91.1 INCIDENTAL LUNG NODULE, > 3MM AND < 8MM: ICD-10-CM

## 2025-04-16 PROCEDURE — 71250 CT THORAX DX C-: CPT

## 2025-04-20 DIAGNOSIS — R91.1 SOLITARY PULMONARY NODULE: Primary | ICD-10-CM

## 2025-04-20 DIAGNOSIS — R91.1 INCIDENTAL LUNG NODULE, > 3MM AND < 8MM: ICD-10-CM

## 2025-04-20 DIAGNOSIS — R93.89 ABNORMAL CHEST CT: ICD-10-CM

## 2025-04-20 DIAGNOSIS — R91.1 LUNG NODULE, SOLITARY: Primary | ICD-10-CM

## 2025-04-20 NOTE — RESULT ENCOUNTER NOTE
Please call the patient regarding his abnormal result.     Gavin, your CT results have been reviewed and shows an increase in the size of a previously seen nodule, and it is recommended that you have additional imaging which includes a PET scan which will show if there is activity in this lesion.  This will be ordered Monday  Please make sure you schedule this as soon as possible so we can understand the nature of this nodule.

## 2025-04-24 DIAGNOSIS — E11.40 CONTROLLED TYPE 2 DIABETES MELLITUS WITH DIABETIC NEUROPATHY, WITHOUT LONG-TERM CURRENT USE OF INSULIN: ICD-10-CM

## 2025-04-24 RX ORDER — SEMAGLUTIDE 1.34 MG/ML
1 INJECTION, SOLUTION SUBCUTANEOUS WEEKLY
Qty: 3 ML | Refills: 2 | OUTPATIENT
Start: 2025-04-24

## 2025-04-25 ENCOUNTER — APPOINTMENT (OUTPATIENT)
Dept: PRIMARY CARE | Facility: CLINIC | Age: 75
End: 2025-04-25
Payer: MEDICARE

## 2025-04-25 VITALS
HEIGHT: 71 IN | BODY MASS INDEX: 29.54 KG/M2 | RESPIRATION RATE: 18 BRPM | SYSTOLIC BLOOD PRESSURE: 124 MMHG | WEIGHT: 211 LBS | HEART RATE: 76 BPM | DIASTOLIC BLOOD PRESSURE: 70 MMHG | OXYGEN SATURATION: 98 %

## 2025-04-25 DIAGNOSIS — R09.82 ALLERGIC RHINITIS WITH POSTNASAL DRIP: ICD-10-CM

## 2025-04-25 DIAGNOSIS — E11.40 CONTROLLED TYPE 2 DIABETES MELLITUS WITH DIABETIC NEUROPATHY, WITHOUT LONG-TERM CURRENT USE OF INSULIN: ICD-10-CM

## 2025-04-25 DIAGNOSIS — R91.1 LUNG NODULE, SOLITARY: ICD-10-CM

## 2025-04-25 DIAGNOSIS — G47.33 OSA (OBSTRUCTIVE SLEEP APNEA): ICD-10-CM

## 2025-04-25 DIAGNOSIS — I10 PRIMARY HYPERTENSION: ICD-10-CM

## 2025-04-25 DIAGNOSIS — J30.9 ALLERGIC RHINITIS WITH POSTNASAL DRIP: ICD-10-CM

## 2025-04-25 DIAGNOSIS — E78.2 MIXED HYPERLIPIDEMIA: ICD-10-CM

## 2025-04-25 DIAGNOSIS — D64.9 ANEMIA, UNSPECIFIED TYPE: ICD-10-CM

## 2025-04-25 DIAGNOSIS — J31.0 RHINITIS MEDICAMENTOSA: ICD-10-CM

## 2025-04-25 DIAGNOSIS — T48.5X5A RHINITIS MEDICAMENTOSA: ICD-10-CM

## 2025-04-25 DIAGNOSIS — C44.521 SQUAMOUS CELL CARCINOMA OF SKIN OF BREAST: ICD-10-CM

## 2025-04-25 DIAGNOSIS — R91.1 INCIDENTAL LUNG NODULE, > 3MM AND < 8MM: Primary | ICD-10-CM

## 2025-04-25 DIAGNOSIS — R93.89 ABNORMAL CT OF THE CHEST: ICD-10-CM

## 2025-04-25 LAB — POC HEMOGLOBIN A1C: 7.1 % (ref 4.2–6.5)

## 2025-04-25 PROCEDURE — 1036F TOBACCO NON-USER: CPT | Performed by: INTERNAL MEDICINE

## 2025-04-25 PROCEDURE — G2211 COMPLEX E/M VISIT ADD ON: HCPCS | Performed by: INTERNAL MEDICINE

## 2025-04-25 PROCEDURE — 1126F AMNT PAIN NOTED NONE PRSNT: CPT | Performed by: INTERNAL MEDICINE

## 2025-04-25 PROCEDURE — 3051F HG A1C>EQUAL 7.0%<8.0%: CPT | Performed by: INTERNAL MEDICINE

## 2025-04-25 PROCEDURE — 1123F ACP DISCUSS/DSCN MKR DOCD: CPT | Performed by: INTERNAL MEDICINE

## 2025-04-25 PROCEDURE — 83036 HEMOGLOBIN GLYCOSYLATED A1C: CPT | Performed by: INTERNAL MEDICINE

## 2025-04-25 PROCEDURE — 99215 OFFICE O/P EST HI 40 MIN: CPT | Performed by: INTERNAL MEDICINE

## 2025-04-25 PROCEDURE — 1157F ADVNC CARE PLAN IN RCRD: CPT | Performed by: INTERNAL MEDICINE

## 2025-04-25 PROCEDURE — 3074F SYST BP LT 130 MM HG: CPT | Performed by: INTERNAL MEDICINE

## 2025-04-25 PROCEDURE — 1160F RVW MEDS BY RX/DR IN RCRD: CPT | Performed by: INTERNAL MEDICINE

## 2025-04-25 PROCEDURE — 1159F MED LIST DOCD IN RCRD: CPT | Performed by: INTERNAL MEDICINE

## 2025-04-25 PROCEDURE — 3078F DIAST BP <80 MM HG: CPT | Performed by: INTERNAL MEDICINE

## 2025-04-25 RX ORDER — AZELASTINE 1 MG/ML
2 SPRAY, METERED NASAL 2 TIMES DAILY
Qty: 30 ML | Refills: 12 | Status: SHIPPED | OUTPATIENT
Start: 2025-04-25 | End: 2026-04-25

## 2025-04-25 RX ORDER — FLUTICASONE PROPIONATE 50 MCG
2 SPRAY, SUSPENSION (ML) NASAL DAILY
Qty: 16 G | Refills: 5 | Status: SHIPPED | OUTPATIENT
Start: 2025-04-25 | End: 2026-04-25

## 2025-04-25 RX ORDER — METFORMIN HYDROCHLORIDE 500 MG/1
500 TABLET ORAL
Qty: 180 TABLET | Refills: 3 | Status: SHIPPED | OUTPATIENT
Start: 2025-04-25 | End: 2026-04-25

## 2025-04-25 ASSESSMENT — ENCOUNTER SYMPTOMS: DIABETIC ASSOCIATED SYMPTOMS: 0

## 2025-04-25 ASSESSMENT — PAIN SCALES - GENERAL: PAINLEVEL_OUTOF10: 0-NO PAIN

## 2025-04-25 NOTE — ASSESSMENT & PLAN NOTE
Orders:    Follow Up In [facility] - PCP - Established    POCT glycosylated hemoglobin (Hb A1C) manually resulted    metFORMIN (Glucophage) 500 mg tablet; Take 1 tablet (500 mg) by mouth 2 times daily (morning and late afternoon).    semaglutide, weight loss, 1.7 mg/0.75 mL pen injector; Inject 1.7 mg under the skin 1 (one) time per week.

## 2025-04-25 NOTE — ASSESSMENT & PLAN NOTE
Orders:    Folate; Future    Reticulocytes; Future    Ferritin; Future    Iron and TIBC; Future    CBC and Auto Differential; Future    Vitamin B12; Future

## 2025-04-25 NOTE — ASSESSMENT & PLAN NOTE
Reviewed CT with patient and need for PET scanning and difficulty we have had getting it ordered.  Order placed to day.

## 2025-04-25 NOTE — ASSESSMENT & PLAN NOTE
Orders:    fluticasone (Flonase) 50 mcg/actuation nasal spray; Administer 2 sprays into each nostril once daily.    azelastine (Astelin) 137 mcg (0.1 %) nasal spray; Administer 2 sprays into each nostril 2 times a day.

## 2025-04-25 NOTE — PROGRESS NOTES
"Subjective   Gavin Hawkins Jr. is a 74 y.o. male who presents for Follow-up and Diabetes (Last A1C was 5.8% on 1.23.2025).      Patient had a fall yesterday and hit his face, glasses cut his nose  Checks glucose at home, this morning his reading was 130- averaging around 120 since metformin dose change   Metformin was decreased when Ozempic was increased     Diabetes  He presents for his follow-up diabetic visit. He has type 2 diabetes mellitus. No MedicAlert identification noted. His disease course has been stable. There are no hypoglycemic associated symptoms. There are no diabetic associated symptoms. There are no hypoglycemic complications. Symptoms are stable. There are no diabetic complications. Risk factors for coronary artery disease include diabetes mellitus, hypertension, male sex and obesity. Current diabetic treatment includes oral agent (monotherapy) and insulin injections. He is compliant with treatment all of the time. His weight is stable. He is following a generally healthy diet. When asked about meal planning, he reported none. He has not had a previous visit with a dietitian. He participates in exercise daily. There is no change in his home blood glucose trend. His breakfast blood glucose is taken between 7-8 am. His breakfast blood glucose range is generally 110-130 mg/dl. An ACE inhibitor/angiotensin II receptor blocker is being taken. He does not see a podiatrist.Eye exam is current.       Review of Systems   All other systems reviewed and are negative.      Objective   /70   Pulse 76   Resp 18   Ht 1.803 m (5' 11\")   Wt 95.7 kg (211 lb)   SpO2 98%   BMI 29.43 kg/m²       Physical Exam  Constitutional:       Appearance: Normal appearance.   HENT:      Head: Normocephalic.      Right Ear: Tympanic membrane, ear canal and external ear normal.      Left Ear: Tympanic membrane, ear canal and external ear normal.      Nose: Congestion and rhinorrhea present. Rhinorrhea is clear.      " Right Turbinates: Enlarged, swollen and pale.      Left Turbinates: Enlarged, swollen and pale.      Mouth/Throat:      Mouth: Mucous membranes are moist.      Pharynx: Oropharynx is clear.   Eyes:      Conjunctiva/sclera: Conjunctivae normal.   Cardiovascular:      Rate and Rhythm: Normal rate and regular rhythm.      Heart sounds: Normal heart sounds.   Pulmonary:      Effort: Pulmonary effort is normal.      Breath sounds: Normal breath sounds.   Musculoskeletal:         General: Normal range of motion.      Cervical back: Neck supple.        Feet:    Feet:      Right foot:      Protective Sensation: 6 sites tested.  3 sites sensed.      Left foot:      Protective Sensation: 6 sites tested.  3 sites sensed.   Skin:     General: Skin is warm and dry.   Neurological:      General: No focal deficit present.      Mental Status: He is alert and oriented to person, place, and time.   Psychiatric:         Mood and Affect: Mood normal.         Assessment & Plan  Controlled type 2 diabetes mellitus with diabetic neuropathy, without long-term current use of insulin    Orders:    Follow Up In [facility] - PCP - Established    POCT glycosylated hemoglobin (Hb A1C) manually resulted    metFORMIN (Glucophage) 500 mg tablet; Take 1 tablet (500 mg) by mouth 2 times daily (morning and late afternoon).    semaglutide, weight loss, 1.7 mg/0.75 mL pen injector; Inject 1.7 mg under the skin 1 (one) time per week.    Incidental lung nodule, > 3mm and < 8mm    Orders:    NM PET CT lung SPN; Future    Abnormal CT of the chest    Orders:    NM PET CT lung SPN; Future    Anemia, unspecified type    Orders:    Folate; Future    Reticulocytes; Future    Ferritin; Future    Iron and TIBC; Future    CBC and Auto Differential; Future    Vitamin B12; Future    Squamous cell carcinoma of skin of breast  Recently diagnosed  Orders:    NM PET CT lung SPN; Future    Rhinitis medicamentosa    Orders:    fluticasone (Flonase) 50  mcg/actuation nasal spray; Administer 2 sprays into each nostril once daily.    azelastine (Astelin) 137 mcg (0.1 %) nasal spray; Administer 2 sprays into each nostril 2 times a day.    Primary hypertension  Stable.       Mixed hyperlipidemia         TRUPTI (obstructive sleep apnea)  Sleep follow up pending.  Rhinitis likely impacting effective ness of CPAP.         Lung nodule, solitary  Reviewed CT with patient and need for PET scanning and difficulty we have had getting it ordered.  Order placed to day.             Salvatore Madera, DO

## 2025-04-28 ENCOUNTER — APPOINTMENT (OUTPATIENT)
Dept: PHARMACY | Facility: HOSPITAL | Age: 75
End: 2025-04-28
Payer: MEDICARE

## 2025-04-28 DIAGNOSIS — E11.40 CONTROLLED TYPE 2 DIABETES MELLITUS WITH DIABETIC NEUROPATHY, WITHOUT LONG-TERM CURRENT USE OF INSULIN: ICD-10-CM

## 2025-04-28 PROCEDURE — RXMED WILLOW AMBULATORY MEDICATION CHARGE

## 2025-04-28 NOTE — PROGRESS NOTES
Clinical Pharmacy Appointment    Patient ID: Gavin Hawkins Jr. is a 74 y.o. male who presents for Diabetes.    Pt is here for Follow Up appointment.     Referring Provider: Salvatore Madera DO  PCP: Salvatore Madera DO  Last visit with PCP: 4/25/25   Next visit with PCP: 6/26/25    Subjective   Drug Interactions  No relevant drug interactions were noted.     Medication System Management  [facility]'s [facility]: [facility]   Adherence/Organization: No concerns  Affordability/Accessibility:  PAP for ozempic      Patient Assistance for Ozempic and Myrbetriq approved through 11/4/25. Will have to be renewed prior to that date to prevent lapse in coverage. Medication(s) will be received at no cost to patient from [facility].      PMHx: Myasthenia gravis, TRUPTI, obesity, HLD, HTN, GERD, BPH      HPI  DIABETES MELLITUS Type 2:    Follows with Endocrinology?: No     Pertinent PMH Review  Known diabetic complications:   Peripheral neuropathy  Obesity  Hx or FH Hx of MTC/MEN2?: No  Pancreatitis?: No  Gastroparesis?: No  UTI/Yeast Infections?: No     Pharmacological Therapy  Current Medications:   Ozempic 1mg weekly  Metformin 500mg BID  Previous Medications: Pioglitazone (weight gain, therapy completed), Januvia        Clarifications to above regimen: None  Adverse Effects: None noted     Glucose Readings  Glucometer/CGM Type: OneTouch Delica  Patient tests BG 1 times per day     Previous home BG readings: FBG averaging <115, sometimes around 101   Current home BG readings: FBG now 120-130's after metformin decrease     Any episodes of hypoglycemia? No   Did patient treat episode of hypoglycemia appropriately? N/A  Does the patient have a prescription for ready-to-use Glucagon? No,       Lifestyle  Diet: No significant changes  Physical Activity: No significant changes     Risk Reducing Medications  Statin? Yes, atorvastatin 40mg  ACE-I/ARB? Yes, losartan 100mg      Preventative Care  Diabetic Eye Exam: Needs completed, last 2023  Monofilament Foot Exam: Following with podiatry    Objective   Allergies[1]  Social History     Social History Narrative    Not on file      Medication Review  Current Outpatient Medications   Medication Instructions    amLODIPine (NORVASC) 5 mg, oral, Daily, for blood pressure    ascorbic acid, vitamin C, 500 mg capsule Take by mouth.    atorvastatin (LIPITOR) 40 mg, oral, Daily    azelastine (Astelin) 137 mcg (0.1 %) nasal spray 2 sprays, Each Nostril, 2 times daily    fluticasone (Flonase) 50 mcg/actuation nasal spray 2 sprays, Each Nostril, Daily    L.acidoph-L.rhamn-B.bif-B.long (Probiotic Acidophilus Biobeads) 12.9 mg (2 billion cell) tablet,delayed release (DR/EC) 1 capsule, oral, Daily    losartan-hydrochlorothiazide (Hyzaar) 100-25 mg tablet 1 tablet, oral, Daily    metFORMIN (GLUCOPHAGE) 500 mg, oral, 2 times daily (morning and late afternoon)    Myrbetriq 25 mg, oral, Daily    omeprazole (PRILOSEC) 40 mg, oral, Daily before breakfast, Do not crush or chew.    ONETOUCH DELICA LANCETS MISC Fine; Use 1 to check glucose once daily    OneTouch Ultra Test strip USE 1 STRIP TO CHECK GLUCOSE ONCE DAILY    pyridostigmine (MESTINON) 60 mg, oral, 4 times daily, 4 times daily    semaglutide 2 mg, subcutaneous, Weekly    vit A,C and E-lutein-minerals (Ocuvite with Lutein) 300 mcg-200 mg-27 mg-2 mg tablet Take by mouth.    vitamin B complex tablet Super B complex tabs      Vitals  BP Readings from Last 2 Encounters:   04/25/25 124/70   01/23/25 139/75     BMI Readings from Last 1 Encounters:   04/25/25 29.43 kg/m²      Labs  A1C  Lab Results   Component Value Date    HGBA1C 7.1 (A) 04/25/2025    HGBA1C 5.8 01/23/2025    HGBA1C 5.9 08/16/2024     BMP  Lab Results   Component Value Date    CALCIUM 9.8 03/28/2025     03/28/2025    K 4.2 03/28/2025    CO2 27 03/28/2025     03/28/2025    BUN 17 03/28/2025    CREATININE 0.87 03/28/2025    EGFR 90  03/28/2025     LFTs  Lab Results   Component Value Date    ALT 15 03/28/2025    AST 14 03/28/2025    ALKPHOS 70 03/28/2025    BILITOT 0.6 03/28/2025     FLP  Lab Results   Component Value Date    TRIG 93 03/28/2025    CHOL 106 03/28/2025    LDLF 68 06/26/2023    LDLCALC 35 03/28/2025    HDL 53 03/28/2025     Urine Microalbumin  Lab Results   Component Value Date    MICROALBCREA 20.2 10/03/2023     Weight Management  Wt Readings from Last 3 Encounters:   04/25/25 95.7 kg (211 lb)   01/23/25 99.3 kg (219 lb)   10/16/24 94.3 kg (207 lb 12.8 oz)      There is no height or weight on file to calculate BMI.     Assessment/Plan   Problem List Items Addressed This Visit       Controlled diabetes mellitus with diabetic neuropathy    Relevant Medications    semaglutide 2 mg/dose (8 mg/3 mL) pen injector    Other Relevant Orders    Referral to Clinical Pharmacy   Patient's goal A1c is < 7%.  Is pt at goal? No, 7.1%  Patient's SMBGs are stable, higher than last month, still within range.  Rationale for plan: Due to PCP and patient preference, tolerability, further glycemic control needed, plan to increase Ozempic and continue metformin. Patient reports continual efficacy with Myrbetriq. Patient prefers 2 month supplies of medication. Will follow up at 2 months to reassess BG after dose increase.     Medication Changes:  CONTINUE  Metformin 1000mg, take 0.5 tabs twice daily  Myrbetriq ER 25mg once daily  INCREASE  Ozempic 2mg once weekly    Monitoring and Education:  Counseled patient on relevant medication mechanisms of action, expectations, side effects, duration of therapy, contraindications, administration, and monitoring parameters.  All questions and concerns addressed. Contact pharmacist with any further questions or concerns prior to next appointment.  Reviewed BG goals: Fasting BG goal , Postprandial BG goal <180 mg/dL, A1C goal <7%%    Clinical Pharmacist follow-up: 6/23/25 at 10AM, Telehealth visit    Continue  all meds under the continuation of care with the referring provider and clinical pharmacy team.    Thank you,  Clarice Garcia  Pharmacy Resident    Verbal consent to manage patient's drug therapy was obtained from the patient. They were informed they may decline to participate or withdraw from participation in pharmacy services at any time.       [1]   Allergies  Allergen Reactions    Ciprofloxacin Other     Myasthenia Gravis

## 2025-05-01 ENCOUNTER — PHARMACY VISIT (OUTPATIENT)
Dept: PHARMACY | Facility: CLINIC | Age: 75
End: 2025-05-01
Payer: COMMERCIAL

## 2025-05-12 ENCOUNTER — HOSPITAL ENCOUNTER (OUTPATIENT)
Dept: RADIOLOGY | Facility: CLINIC | Age: 75
Discharge: HOME | End: 2025-05-12
Payer: MEDICARE

## 2025-05-12 DIAGNOSIS — R93.89 ABNORMAL CT OF THE CHEST: ICD-10-CM

## 2025-05-12 DIAGNOSIS — C44.521 SQUAMOUS CELL CARCINOMA OF SKIN OF BREAST: ICD-10-CM

## 2025-05-12 DIAGNOSIS — R91.1 INCIDENTAL LUNG NODULE, > 3MM AND < 8MM: ICD-10-CM

## 2025-05-12 PROCEDURE — 78815 PET IMAGE W/CT SKULL-THIGH: CPT | Mod: PET TUMOR INIT TX STRAT | Performed by: RADIOLOGY

## 2025-05-12 PROCEDURE — 78815 PET IMAGE W/CT SKULL-THIGH: CPT | Mod: PI

## 2025-05-12 PROCEDURE — 3430000001 HC RX 343 DIAGNOSTIC RADIOPHARMACEUTICALS: Performed by: INTERNAL MEDICINE

## 2025-05-12 PROCEDURE — A9552 F18 FDG: HCPCS | Performed by: INTERNAL MEDICINE

## 2025-05-12 RX ORDER — FLUDEOXYGLUCOSE F 18 200 MCI/ML
12.2 INJECTION, SOLUTION INTRAVENOUS
Status: COMPLETED | OUTPATIENT
Start: 2025-05-12 | End: 2025-05-12

## 2025-05-12 RX ADMIN — FLUDEOXYGLUCOSE F 18 12.2 MILLICURIE: 200 INJECTION, SOLUTION INTRAVENOUS at 09:37

## 2025-05-13 NOTE — PROGRESS NOTES
Patient: Gavin Hawkins Jr.  : 1950 AGE: 75 y.o. SEX:male   MRN: [id number]   Provider: ALVNI Cameron     Location [facility]   Service Date: 2025     PCP: Salvatore Madera DO   Referred by: No ref. provider found          [facility]  Follow Up Visit Note      HISTORY OF PRESENT ILLNESS     Gavin Hawkins Jr. is a 75 y.o. male with a h/o Non-insulin-dependent diabetes mellitus, GERD, hyperlipidemia, hypertension, myasthenia gravis, nonmetastatic malignant melanoma, neuropathy, obesity, BPH, restless legs, sleep apnea who presents to [facility] for a comprehensive sleep medicine evaluation.     25: Annual TRUPTI follow up. Brought machine into office visit today for DL (no longer transmitting data). His My Air phone carlos is also not transmitting his CPAP usage data to his cell phone. Last lost 48lbs. Does not use humidifier.   PAP Adherence:  Mask: MASK TYPE: NASAL PILLOWS MASK  Issues with therapy: ISSUES WITH THERAPY: denies ;   Benefits with PAP: PERCEIVED BENEFITS OF PAP: decreased or no snoring decreased nocturnal awakenings better sleep quality        Media Information        24: NPV for TRUPTI management, needs CPAP supplies. Patient was diagnosed with TRUPTI about 20 years ago? And was started on CPAP since then. Last sleep study  showed severe TRUPTI. Currently on auto-CPAP 8-16  cm H2O with EPR/Flex 3 and Dreamwear nasal medium mask through [employer]. Patient has been using machine every night. Patient denies machine problems, mask leak, air hunger, aerophagia, dry mouth, skin irritation, and nasal congestion. Complains of mask leak. The following are patient's perceived benefits of PAP: no snoring on PAP, refreshing sleep, decreased daytime sleepiness and/or fatigue, and better quality of sleep. Most nights will take to get his daughter up for work around 4am and does not reapply  mask. His My [employer] phone carlos is not transmitting his CPAP usage data to his cell phone.     Visit 3/19/21 with Ange Nam:  JULIA returns to clinic for follow-up today to review his sleep study results.   Julia states that he has continue to use is older CPAP device at the same settings. He states that the settings have not been changed since he started therapy. He mentions that his urge/ nocturia has improved with medication adjustment from Urology. He states he does wake unrefreshed and has EDS/ with naps during the day.     He is concerned that the pressure from CPAP is not effective d/t he states he does feel SOB at intervals. He endorses intermittent nasal congestion.   He would like to move forward with a new device with [facility].     Received results of sleep study dated 3/8/2021 with [facility] showing severe sleep apnea with an AHI of 47.0 and SpO2 bigg of 70.9%. Recommendation is to consider APAP 4-20 cwp followed by machine download.         Media Information        ESS: 10    REVIEW OF SYSTEMS     All other systems have been reviewed and are negative.    ALLERGIES     Allergies   Allergen Reactions    Ciprofloxacin Other     Myasthenia Gravis       MEDICATIONS     Current Outpatient Medications   Medication Sig Dispense Refill    amLODIPine (Norvasc) 5 mg tablet Take 1 tablet (5 mg) by mouth once daily. for blood pressure 90 tablet 3    ascorbic acid, vitamin C, 500 mg capsule Take by mouth.      atorvastatin (Lipitor) 40 mg tablet Take 1 tablet (40 mg) by mouth once daily. 90 tablet 3    azelastine (Astelin) 137 mcg (0.1 %) nasal spray Administer 2 sprays into each nostril 2 times a day. 30 mL 12    fluticasone (Flonase) 50 mcg/actuation nasal spray Administer 2 sprays into each nostril once daily. 16 g 5    L.acidoph-L.rhamn-B.bif-B.long (Probiotic Acidophilus Risk Ident) 12.9 mg (2 billion cell) tablet,delayed release (DR/EC) Take 1 capsule by mouth once daily. 90 capsule 3     "losartan-hydrochlorothiazide (Hyzaar) 100-25 mg tablet Take 1 tablet by mouth once daily. 90 tablet 3    metFORMIN (Glucophage) 500 mg tablet Take 1 tablet (500 mg) by mouth 2 times daily (morning and late afternoon). 180 tablet 3    mirabegron (Myrbetriq) 25 mg tablet extended release 24 hr 24 hr tablet Take 1 tablet (25 mg) by mouth once daily. 90 tablet 3    ONETOUCH DELICA LANCETS MISC Fine; Use 1 to check glucose once daily      OneTouch Ultra Test strip USE 1 STRIP TO CHECK GLUCOSE ONCE DAILY      pyridostigmine (Mestinon) 60 mg tablet Take 1 tablet (60 mg) by mouth 4 times a day. 4 times daily 360 tablet 3    semaglutide 2 mg/dose (8 mg/3 mL) pen injector Inject 2 mg under the skin 1 (one) time per week. 6 mL 6    vit A,C and E-lutein-minerals (Ocuvite with Lutein) 300 mcg-200 mg-27 mg-2 mg tablet Take by mouth.      vitamin B complex tablet Super B complex tabs      omeprazole (PriLOSEC) 40 mg DR capsule Take 1 capsule (40 mg) by mouth once daily in the morning. Take before meals. Do not crush or chew. 90 capsule 3     No current facility-administered medications for this visit.       PAST HISTORIES     PERTINENT PAST MEDICAL HISTORY: See HPI    PERTINENT PAST SURGICAL HISTORY for Sleep Medicine:  non-contributory    PERTINENT FAMILY HISTORY for Sleep Medicine:  Patient denies family history of any sleep disorder.    PERTINENT SOCIAL HISTORY:  He  reports that he has never smoked. He has never used smokeless tobacco. He reports current alcohol use. He reports that he does not use drugs. He currently lives with spouse and retired from work.     Active Problems, Allergy List, Medication List, and PMH/PSH/FH/Social Hx have been reviewed and reconciled in chart. No significant changes unless documented in the pertinent chart section. Updates made when necessary.     PHYSICAL EXAM     VITAL SIGNS: /82   Pulse 100   Resp 18   Ht 1.753 m (5' 9\")   Wt 97.1 kg (214 lb)   SpO2 96%   BMI 31.60 kg/m² "     CURRENT WEIGHT:   Vitals:    05/20/25 0901   Weight: 97.1 kg (214 lb)     PREVIOUS WEIGHTS:  Wt Readings from Last 3 Encounters:   05/20/25 97.1 kg (214 lb)   04/25/25 95.7 kg (211 lb)   01/23/25 99.3 kg (219 lb)     Constitutional: Alert and oriented, cooperative, no obvious distress   HEENT: Non icteric or anemic, EOM WNL bilaterally   Neck: Supple, no JVD, no goiter, no adenopathy, no rigidity     RESULTS/DATA     Iron (ug/dL)   Date Value   10/03/2023 76   04/20/2022 76     % Saturation (%)   Date Value   10/03/2023 20 (L)     Iron Saturation (%)   Date Value   04/20/2022 23 (L)     TIBC (ug/dL)   Date Value   10/03/2023 389   04/20/2022 333     Ferritin   Date Value   10/03/2023 46 ng/mL   04/20/2022 58 ug/L       CARBON DIOXIDE   Date Value Ref Range Status   03/28/2025 27 20 - 32 mmol/L Final       ASSESSMENT/PLAN     Mr. Hawkins is a 75 y.o. male and He was referred to the [facility] for evaluation of TRUPTI    Problem List, Orders, Assessment, Recommendations:    OBSTRUCTIVE SLEEP APNEA- severe  Sleep study 3/8/2021 at [facility] showing severe sleep apnea with an AHI of 47.0 and SpO2 bigg of 70.9%  - CPAP set up 3/30/21    - Retrieved and personally reviewed recent PAP adherence download data today. See HPI.  - excellent compliance to PAP therapy, residual AHI at goal, and good control of TRUPTI symptoms   - continue current setting 8-16 CWP  - renew PAP supply orders, order placed to DME- [facility]  - diet, exercise, and weight loss were emphasized today in clinic, as were non-supine sleep, avoiding alcohol in the late evening, and driving or operating heavy machinery when sleepy. Patient verbalized understanding.      HYPERTENSION:  -Last Echo: EF 60% on Sept 2019  -Follows with PCP    Obesity  BMI Readings from Last 1 Encounters:   05/20/25 31.60 kg/m²     - Encouraged healthy weight loss via diet and exercise  - Weight loss can help in the long term treatment of TRUPTI.  - Defer  management to PCP     All of patient's questions were answered. He verbalizes understanding and agreement with my assessment and plan.    Disposition    Return to clinic in 12 months

## 2025-05-18 NOTE — PROGRESS NOTES
Patient: Gavin Hawkins Jr.    [id number]  : 1950 -- AGE 75 y.o.    Provider: Sis DOYLE CNP     Location [facility]   Service Date: 25              [facility]  New Visit Note      HISTORY OF PRESENT ILLNESS     The patient's referring provider is: Salvatore Madera DO    HISTORY OF PRESENT ILLNESS   Gavin Hawkins Jr. is a 75 y.o. male with a history of hypertension, hyperlipidemia, DM2, myasthenia gravis, TRUPTI on CPAP, and seasonal allergies who is a never smoker, who presents to a [facility] for an initial evaluation for lung nodules.     I have independently interviewed and examined the patient in the office and reviewed available records.    Current History    On today's visit, the patient reports his respiratory status is stable, he denies cough, wheeze, shortness of breath, chest tightness and ER visits for breathing issues. Has severe allergies that are kicking up which cause him to cough due to postnasal drip, he is starting new nasal sprays.  Takes pantoprazole for GERD is under good control.    Previous pulmonary history: He has no history of recurrent infections, or lung disease as a child.  He had no previous lung hx, never on oxygen or inhaler therapy.     Inhalers/nebulized medications: never    Hospitalization History: He has not been hospitalized over the last year for breathing related problem.    Sleep history: CPAP       ALLERGIES AND MEDICATIONS     ALLERGIES  Allergies[1]    MEDICATIONS  Current Medications[2]      PAST HISTORY     PAST MEDICAL HISTORY  Hypertension  Hyperlipidemia  Diabetes type 2  Myasthenia gravis  TRUPTI - compliant w/ CPAP  Seasonal allergies    PAST SURGICAL HISTORY  Surgical History[3]    IMMUNIZATION HISTORY  Immunization History   Administered Date(s) Administered    Flu vaccine, quadrivalent, high-dose, preservative free, age 65y+ (FLUZONE) 2020    Flu  vaccine, trivalent, preservative free, HIGH-DOSE, age 65y+ (Fluzone) 09/24/2019, 09/07/2020, 11/11/2021, 10/14/2022, 10/03/2023, 09/11/2024    Influenza, seasonal, injectable 07/20/2017, 10/14/2022    Moderna COVID-19 vaccine, 12 years and older (50mcg/0.5mL)(Spikevax) 10/18/2023, 09/11/2024    Moderna SARS-CoV-2 Vaccination 02/03/2021, 03/03/2021, 11/01/2021, 04/19/2022, 09/11/2024    Pneumococcal Conjugate PCV 7 1950    Pneumococcal conjugate vaccine, 13-valent (PREVNAR 13) 04/01/2015    Pneumococcal conjugate vaccine, 20-valent (PREVNAR 20) 08/16/2024    Pneumococcal polysaccharide vaccine, 23-valent, age 2 years and older (PNEUMOVAX 23) 11/03/2020    RSV, 60 Years And Older (AREXVY) 10/18/2023    Tdap vaccine, age 7 year and older (BOOSTRIX, ADACEL) 02/15/2024    Zoster vaccine, recombinant, adult (SHINGRIX) 07/16/2021, 02/25/2022    Zoster, Unspecified 07/16/2021, 02/25/2022    Zoster, live 04/02/2015       SOCIAL HISTORY  Tobacco Smoking: never  Smokeless Tobacco/Vaping: never  Illicit drugs: never  Alcohol consumption: occasional  Pets: dog  Living situation: lives at home with wife and daughter    OCCUPATIONAL/ENVIRONMENTAL HISTORY  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  No known exposure to asbestos, silica, beryllium or inhaled metals.  No exposure to birds or exotic animals.    FAMILY HISTORY     No family history of pulmonary disease.  No family history of cancer.  No family history of autoimmune disorders.    REVIEW OF SYSTEMS     REVIEW OF SYSTEMS  Review of Systems    Constitutional: No fever, no chills, no night sweats.    Eyes: No double vision, no floaters, no dry eyes.   ENT: See HPI.   Neck: No neck stiffness.  Cardiovascular: No sharp chest pain, no heart racing, no leg swelling.  Respiratory: as noted in HPI.   Gastrointestinal: No nausea, no vomiting, no diarrhea.   Musculoskeletal: No joint pain, no back pain.   Integumentary: No rashes or  sores.  Neurological: No dizziness, no headaches. Sleeping well.  Psychiatric: No mood changes.   Endocrine: No hot flashes, no cold intolerance, weight is stable.  Hematologic: No easy bruising or bleeding.    PHYSICAL EXAM     VITAL SIGNS:   Vitals:    05/23/25 1449   BP: 119/77   Pulse: 86   Resp: 18   SpO2: 97%          CURRENT WEIGHT: Body mass index is 31.54 kg/m².    PREVIOUS WEIGHTS:  Wt Readings from Last 3 Encounters:   04/25/25 95.7 kg (211 lb)   01/23/25 99.3 kg (219 lb)   10/16/24 94.3 kg (207 lb 12.8 oz)       Physical Exam    Constitutional: General appearance: Alert and oriented.  No acute distress. Well developed, well nourished.  Head and face: Symmetric  ENT: external inspection of ear and nose normal. No intranasal polyps. No oropharyngeal exudates.    Oropharynx: normal   Neck: supple, no lymphadenopathy  Pulmonary: Chest is normal. No increased work of breathing or signs of respiratory distress. Clear to auscultation bilaterally - no crackles, wheezing, or rhonchi.   Cardiovascular: Heart rate and rhythm normal. Normal S1, S2 - no murmurs, gallops, or pericardial rub.   Abdomen: Soft, non tender, +BS  Extremities: No edema. No clubbing or cyanosis of the fingernails.    Neurologic: Moves all four extremities   MSK: Normal movements of extremities. Gait normal   Psychiatric: Intact judgement and insight.    RESULTS/DATA     Pulmonary Function Test Results     No testing done    Chest Radiograph     XR chest 1 view 08/20/2023    Narrative  Interpreted By:  FATOU ALEJANDRE MD  MRN: [id number]  Patient Name: JULIA LORENZO    STUDY:  CHEST 1 VIEW;  8/20/2023 7:32 am    INDICATION:  chest pain .    COMPARISON:  11/12/2018    ACCESSION NUMBER(S):  [id number]    ORDERING CLINICIAN:  TYSON PETERSON    FINDINGS:  The patient is rotated. No definite focal infiltrate or pleural  effusion. Unremarkable cardiomediastinal silhouette. No pulmonary  vascular congestion.    Impression  No active disease in the  chest identified.      Chest CT Scan     4/16/25:  CT chest wo IV contrast  Status: Final result     PACS Images     Show images for CT chest wo IV contrast  Signed by    Signed Time Phone Pager   Doug Forrest MD 4/17/2025 10:51 [phone]      Exam Information    Status Exam Begun Exam Ended   Final 4/16/2025 08:01 4/16/2025 08:12     Study Result    Narrative & Impression   Interpreted By:  Doug Forrest,   STUDY:  CT CHEST WO IV CONTRAST; 4/16/2025 8:12 am      INDICATION:  Signs/Symptoms:Follow up lung cancer screening with abnormal results.      COMPARISON:  Chest CT scans dated back to 05/07/2028      ACCESSION NUMBER(S):  [id number]      ORDERING CLINICIAN:  JAMILAH MARLEY      TECHNIQUE:  Helical CT imaging of the chest was performed without intravenous  contrast.      FINDINGS:          CHEST WALL, LOWER NECK AND VISUALIZED THYROID: Unremarkable.      MEDIASTINUM, AXILLA AND KIKE: No axillary, mediastinal or definite  hilar adenopathy by imaging size criteria.  Note that the evaluation  for hilar adenopathy is limited due to the lack of intravenous  contrast.      HEART: No cardiomegaly. No pericardial effusion      VESSELS: Moderate coronary artery calcifications. Trace thoracic  aortic vascular calcifications.      ESOPHAGUS: Normal in caliber.      LUNG, PLEURA, AND LARGE AIRWAYS: More conspicuous right middle lobe  faint pulmonary nodule measuring 1 cm (image 249), previously 0.8 cm)  unchanged ill-defined solid pulmonary nodule in the right lower lobe  measuring 0.8 cm (image 223). Stable other subcentimeter pulmonary  nodules, 0.4 cm right middle lobe nodule (image 260), 0.2 cm right  middle lobe nodule (image 263), 0.5 cm left lower lobe nodule (image  158), left apical nodule measuring 0.3 cm (image 78). No new  suspicious pulmonary nodules or masses. Bilateral emphysematous  changes. Bibasal atelectasis. No major consolidation, pleural  effusion pneumothorax. Central airways are patent.       UPPER ABDOMEN: Cholelithiasis. Borderline splenomegaly measuring 14  cm. Stable left upper pole exophytic hypoattenuating lesion measuring  2 cm likely cysts.      BONES: Multilevel degenerative spine changes.      IMPRESSION:  1. Growing right middle lobe pulmonary nodule currently measuring 1  cm, measured 0.4 cm in 2018 CT scan. Other bilateral subcentimeter  pulmonary nodules are grossly unchanged. Finding would warrant  further assessment by dedicated PET scan.  2. No enlarged axillary or mediastinal lymph nodes.  3. Borderline splenomegaly measuring 14 cm as in prior      Critical Finding:  See findings. Notification was initiated on  4/17/2025 at 10:50 am by  Doug Forrest.  (**-YCF-**) Instructions:      Signed by: Doug Forrest 4/17/2025 10:51 AM  Dictation workstation:   [id number]         Echocardiogram     9/11/19:  Echocardiogram    Height: Not recorded   Weight: Not recorded   Blood Pressure: Not recorded    Date of Study: 09/11/2019   Ordering Provider: Syngo Conversion   Clinical Indications: None Listed       Reading Physicians  Performing Staff    Syngo Conversion    No performing staff assigned to study.     Interpretation Summary       [facility], [address] [place] [address]                 Tel [phone] and Fax [phone]     TRANSTHORACIC ECHOCARDIOGRAM REPORT        Patient Name:     JULIA       Reading Physician:   [id number] Nicola LORENZO  Study Date:       9/11/2019     Referring Physician: Dahiana Barreto CNP  MRN/PID:          [id number]      PCP:  Accession/Order#: [id number]  Department Location: [facility]  YOB: 1950     Fellow:  Gender:           M             Nurse:               Brien Rowell RN  Admit Date:                     Sonographer:         Dahiana Hagan RDCS,                                                       SEEMA  Admission Status: Outpatient    Additional Staff:  Height:            180.34 cm     CC Report to:  Weight:           104.33 kg     Study Type:          Echocardiogram  BSA:              2.24 m2     Diagnosis/ICD: R01.1-Cardiac murmur, unspecified  Indication:    Murmur, Pre-op  Procedure/CPT: Echo Complete w Full [id number]     Patient History:  Diabetes:         Yes  Pertinent         Hyperlipidemia, HTN and Murmur. myasthenia gravis, pre-op,  History:          murmur.     Study Detail: The following Echo studies were performed: 2D, M-Mode, Doppler and                color flow. Technically challenging study due to body habitus.                Definity used as a contrast agent for endocardial border                definition. Total contrast used for this procedure was 3 mL via IV                push.        PHYSICIAN INTERPRETATION:  Left Ventricle: The left ventricular systolic function is normal, with an estimated ejection fraction of 60%. There are no regional wall motion abnormalities. The left ventricular cavity size is normal. There is left ventricular concentric remodeling. Abnormal (paradoxical) septal motion, consistent with an intraventricular conduction delay. Spectral Doppler shows a pseudonormal pattern of left ventricular diastolic filling.  Left Atrium: The left atrium is mild to moderately dilated.  Right Ventricle: The right ventricle is normal in size. There is normal right ventricular global systolic function.  Right Atrium: The right atrium is normal in size.  Aortic Valve: The aortic valve is probably trileaflet. There is mild aortic valve cusp calcification. There are increased aortic valve velocities due to increased flow/dynamic ejection. There is mild aortic valve regurgitation. The peak instantaneous gradient of the aortic valve is 14.6 mmHg. The mean gradient of the aortic valve is 7.2 mmHg.  Mitral Valve: The mitral valve is normal in structure. There is mild mitral annular calcification. There is mild mitral valve regurgitation.  Tricuspid Valve: The  tricuspid valve is structurally normal. There is trace to mild tricuspid regurgitation.  Pulmonic Valve: The pulmonic valve is not well visualized. There is trace pulmonic valve regurgitation.  Pericardium: There is a trivial pericardial effusion.  Aorta: The aortic root is abnormal. There is no dilatation of the ascending aorta. There is mild dilatation of the aortic root.  Pulmonary Artery: The tricuspid regurgitant velocity is 2.15 m/s, and with an estimated right atrial pressure of 5 mmHg, the estimated pulmonary artery pressure is normal with the RVSP at 23.6 mmHg.        CONCLUSIONS:   1. The left ventricular systolic function is normal with a 60% estimated ejection fraction.   2. Spectral Doppler shows a pseudonormal pattern of left ventricular diastolic filling.   3. The left atrium is mild to moderately dilated.   4. There is mild aortic valve regurgitation.   5. There is mild mitral valve regurgitation.     QUANTITATIVE DATA SUMMARY:  2D MEASUREMENTS:                           Normal Ranges:  IVSd:          1.27 cm   (0.6-1.1cm)  LVPWd:         1.12 cm   (0.6-1.1cm)  LVIDd:         4.44 cm   (3.9-5.9cm)  LVIDs:         3.36 cm  LV Mass Index: 94.7 g/m2  LV % FS        24.2 %     LA VOLUME:                                Normal Ranges:  LA Vol A4C:        98.3 ml    (22+/-6mL/m2)  LA Vol A2C:        109.0 ml  LA Vol BP:         105.2 ml  LA Vol Index A4C:  43.9 ml/m2  LA Vol Index A2C:  48.7 ml/m2  LA Vol Index BP:   47.0 ml/m2  LA Area A4C:       27.2 cm2  LA Area A2C:       28.2 cm2  LA Major Axis A4C: 6.4 cm  LA Major Axis A2C: 6.2 cm  LA Vol A4C:        93.0 ml  LA Vol A2C:        106.3 ml     RA VOLUME BY A/L METHOD:                        Normal Ranges:  RA Area A4C: 15.1 cm2     M-MODE MEASUREMENTS:                   Normal Ranges:  Ao Root: 3.20 cm (2.0-3.7cm)  LAs:     4.15 cm (2.7-4.0cm)     AORTA MEASUREMENTS:                       Normal Ranges:  Ao Sinus, d: 4.00 cm (2.1-3.5cm)  Asc Ao, d:    3.30 cm (2.1-3.4cm)     LV SYSTOLIC FUNCTION BY 2D PLANIMETRY (MOD):                      Normal Ranges:  EF-A4C View: 50.4 % (>55%)  EF-A2C View: 65.3 %  EF-Biplane:  57.8 %     LV DIASTOLIC FUNCTION:                               Normal Ranges:  MV Peak E:        0.75 m/s   (0.7-1.2 m/s)  MV Peak A:        0.70 m/s   (0.42-0.7 m/s)  E/A Ratio:        1.07       (1.0-2.2)  MV lateral e'     0.08 m/s  MV medial e'      0.09 m/s  PulmV Sys Harley:    54.51 cm/s  PulmV Otto Harley:   33.12 cm/s  PulmV S/D Harley:    1.69  PulmV A Revs Harley: 37.16 cm/s     MITRAL VALVE:                  Normal Ranges:  MV DT: 138 msec (150-240msec)     AORTIC VALVE:                                     Normal Ranges:  AoV Vmax:                1.91 m/s  (<1.7m/s)  AoV Peak P.6 mmHg (<20mmHg)  AoV Mean P.2 mmHg  (1.7-11.5mmHg)  LVOT Max Harley:            1.41 m/s  (<1.1m/s)  AoV VTI:                 33.25 cm  (18-25cm)  LVOT VTI:                27.17 cm  LVOT Diameter:           2.30 cm   (1.8-2.4cm)  AoV Area, VTI:           3.40 cm2  (2.5-5.5cm2)  AoV Area,Vmax:           3.07 cm2  (2.5-4.5cm2)  AoV Area, planim:        4.00 cm2  (2.5-4.5cm2)  AoV Dimensionless Index: 0.82     RIGHT VENTRICLE:  TAPSE: 24.0 mm  RV s'  0.11 m/s     TRICUSPID VALVE/RVSP:                              Normal Ranges:  Peak TR Velocity: 2.15 m/s  RV Syst Pressure: 23.6 mmHg (< 30mmHg)     PULMONIC VALVE:                       Normal Ranges:  PV Max Harley: 1.2 m/s  (0.6-0.9m/s)  PV Max P.9 mmHg     Pulmonary Veins:  PulmV A Revs Harley: 37.16 cm/s  PulmV Otto Harley:   33.12 cm/s  PulmV S/D Harley:    1.69  PulmV Sys Harley:    54.51 cm/s        [id number] Nicola Salmeron MD  Electronically signed on 2019 at 10:26:22 AM          Labwork     Lab Results   Component Value Date    WBC 9.3 2025    HGB 12.6 (L) 2025    HCT 38.4 (L) 2025    MCV 87.7 2025     2025      Lab Results   Component Value Date    GLUCOSE 105 (H)  "03/28/2025    CALCIUM 9.8 03/28/2025     03/28/2025    K 4.2 03/28/2025    CO2 27 03/28/2025     03/28/2025    BUN 17 03/28/2025    CREATININE 0.87 03/28/2025      Lab Results   Component Value Date    ALT 15 03/28/2025    AST 14 03/28/2025    ALKPHOS 70 03/28/2025    BILITOT 0.6 03/28/2025        Protime   Date/Time Value Ref Range Status   08/20/2023 07:05 AM 12.6 9.8 - 12.8 sec Final     Comment:     Note new reference range as of 6/20/2023 at 10:00am.     INR   Date/Time Value Ref Range Status   08/20/2023 07:05 AM 1.1 0.9 - 1.1 Final       No results found for: \"ICIGE\", \"IGE\", \"ICA04\", \"ASPFU\", \"IGG\", \"IGA\", \"IGM\"    Peripheral Eosinophil Count/Percentage:   Eosinophils Absolute (x10E9/L)   Date Value   08/20/2023 0.02     Eosinophils % (%)   Date Value   08/20/2023 0.1       ASSESSMENT/PLAN   Mr. Hawkins is a 75 y.o. male, with a history of hypertension, hyperlipidemia, DM2, myasthenia gravis, TRUPTI on CPAP, and seasonal allergies who is a never smoker, who presents to a [facility] for an initial evaluation for lung nodules.     Problem List and Orders  Problem List Items Addressed This Visit       Incidental lung nodule, > 3mm and < 8mm    Lung nodule, solitary    Relevant Orders    CT chest wo IV contrast     Other Visit Diagnoses         Lung nodules    -  Primary    Relevant Orders    Follow Up In Pulmonology      Abnormal chest CT                Assessment and Plan / Recommendations:  Problem List Items Addressed This Visit    None     Lung nodules  Growth in RML nodule from 6mm to 1cm from 7/2019 to 4/2025 as well as more dense looking than in 7/2019 and 9/2023.  8mm RLL more dense in 2023 not as dense in 9/2024 but then again more dense in 4/2025  No avidity on PET/CT on either nodules  - Repeat scan in 6 months    Follow up in 6 (after CT Chest) or sooner if needed.    If you have any questions please call the office [phone]    Thank you for visiting " the Pulmonary clinic today!   Sis Mcgovern CNP  [phone]          [1]   Allergies  Allergen Reactions    Ciprofloxacin Other     Myasthenia Gravis   [2]   Current Outpatient Medications   Medication Sig Dispense Refill    amLODIPine (Norvasc) 5 mg tablet Take 1 tablet (5 mg) by mouth once daily. for blood pressure 90 tablet 3    ascorbic acid, vitamin C, 500 mg capsule Take by mouth.      atorvastatin (Lipitor) 40 mg tablet Take 1 tablet (40 mg) by mouth once daily. 90 tablet 3    azelastine (Astelin) 137 mcg (0.1 %) nasal spray Administer 2 sprays into each nostril 2 times a day. 30 mL 12    fluticasone (Flonase) 50 mcg/actuation nasal spray Administer 2 sprays into each nostril once daily. 16 g 5    L.acidoph-L.rhamn-B.bif-B.long (Probiotic Acidophilus [employer]) 12.9 mg (2 billion cell) tablet,delayed release (DR/EC) Take 1 capsule by mouth once daily. 90 capsule 3    losartan-hydrochlorothiazide (Hyzaar) 100-25 mg tablet Take 1 tablet by mouth once daily. 90 tablet 3    metFORMIN (Glucophage) 500 mg tablet Take 1 tablet (500 mg) by mouth 2 times daily (morning and late afternoon). 180 tablet 3    mirabegron (Myrbetriq) 25 mg tablet extended release 24 hr 24 hr tablet Take 1 tablet (25 mg) by mouth once daily. 90 tablet 3    omeprazole (PriLOSEC) 40 mg DR capsule Take 1 capsule (40 mg) by mouth once daily in the morning. Take before meals. Do not crush or chew. 90 capsule 3    ONETOUCH DELICA LANCETS MISC Fine; Use 1 to check glucose once daily      OneTouch Ultra Test strip USE 1 STRIP TO CHECK GLUCOSE ONCE DAILY      pyridostigmine (Mestinon) 60 mg tablet Take 1 tablet (60 mg) by mouth 4 times a day. 4 times daily 360 tablet 3    semaglutide 2 mg/dose (8 mg/3 mL) pen injector Inject 2 mg under the skin 1 (one) time per week. 6 mL 6    vit A,C and E-lutein-minerals (Ocuvite with Lutein) 300 mcg-200 mg-27 mg-2 mg tablet Take by mouth.      vitamin B complex tablet Super B complex tabs       No current  facility-administered medications for this visit.   [3]   Past Surgical History:  Procedure Laterality Date    OTHER SURGICAL HISTORY  04/28/2022    Cataract surgery    OTHER SURGICAL HISTORY  04/24/2018    Neurorrhaphy Radial Nerve Right

## 2025-05-20 ENCOUNTER — APPOINTMENT (OUTPATIENT)
Dept: SLEEP MEDICINE | Facility: CLINIC | Age: 75
End: 2025-05-20
Payer: MEDICARE

## 2025-05-20 VITALS
BODY MASS INDEX: 31.7 KG/M2 | OXYGEN SATURATION: 96 % | WEIGHT: 214 LBS | RESPIRATION RATE: 18 BRPM | SYSTOLIC BLOOD PRESSURE: 118 MMHG | HEIGHT: 69 IN | DIASTOLIC BLOOD PRESSURE: 82 MMHG | HEART RATE: 100 BPM

## 2025-05-20 DIAGNOSIS — Z78.9 NONSMOKER: ICD-10-CM

## 2025-05-20 DIAGNOSIS — I10 PRIMARY HYPERTENSION: ICD-10-CM

## 2025-05-20 DIAGNOSIS — G47.33 OSA (OBSTRUCTIVE SLEEP APNEA): Primary | ICD-10-CM

## 2025-05-20 DIAGNOSIS — E66.811 OBESITY (BMI 30.0-34.9): ICD-10-CM

## 2025-05-20 PROCEDURE — 1036F TOBACCO NON-USER: CPT | Performed by: NURSE PRACTITIONER

## 2025-05-20 PROCEDURE — 1160F RVW MEDS BY RX/DR IN RCRD: CPT | Performed by: NURSE PRACTITIONER

## 2025-05-20 PROCEDURE — 99214 OFFICE O/P EST MOD 30 MIN: CPT | Performed by: NURSE PRACTITIONER

## 2025-05-20 PROCEDURE — 1159F MED LIST DOCD IN RCRD: CPT | Performed by: NURSE PRACTITIONER

## 2025-05-20 PROCEDURE — G2211 COMPLEX E/M VISIT ADD ON: HCPCS | Performed by: NURSE PRACTITIONER

## 2025-05-20 PROCEDURE — 3074F SYST BP LT 130 MM HG: CPT | Performed by: NURSE PRACTITIONER

## 2025-05-20 PROCEDURE — 3079F DIAST BP 80-89 MM HG: CPT | Performed by: NURSE PRACTITIONER

## 2025-05-20 ASSESSMENT — SLEEP AND FATIGUE QUESTIONNAIRES
ESS-CHAD TOTAL SCORE: 10
HOW LIKELY ARE YOU TO NOD OFF OR FALL ASLEEP WHILE WATCHING TV: MODERATE CHANCE OF DOZING
HOW LIKELY ARE YOU TO NOD OFF OR FALL ASLEEP WHILE LYING DOWN TO REST IN THE AFTERNOON WHEN CIRCUMSTANCES PERMIT: HIGH CHANCE OF DOZING
HOW LIKELY ARE YOU TO NOD OFF OR FALL ASLEEP WHILE SITTING QUIETLY AFTER LUNCH WITHOUT ALCOHOL: SLIGHT CHANCE OF DOZING
SITING INACTIVE IN A PUBLIC PLACE LIKE A CLASS ROOM OR A MOVIE THEATER: SLIGHT CHANCE OF DOZING
HOW LIKELY ARE YOU TO NOD OFF OR FALL ASLEEP IN A CAR, WHILE STOPPED FOR A FEW MINUTES IN TRAFFIC: WOULD NEVER DOZE
HOW LIKELY ARE YOU TO NOD OFF OR FALL ASLEEP WHEN YOU ARE A PASSENGER IN A CAR FOR AN HOUR WITHOUT A BREAK: WOULD NEVER DOZE
HOW LIKELY ARE YOU TO NOD OFF OR FALL ASLEEP WHILE SITTING AND TALKING TO SOMEONE: SLIGHT CHANCE OF DOZING
HOW LIKELY ARE YOU TO NOD OFF OR FALL ASLEEP WHILE SITTING AND READING: MODERATE CHANCE OF DOZING

## 2025-05-20 ASSESSMENT — COLUMBIA-SUICIDE SEVERITY RATING SCALE - C-SSRS
6. HAVE YOU EVER DONE ANYTHING, STARTED TO DO ANYTHING, OR PREPARED TO DO ANYTHING TO END YOUR LIFE?: NO
2. HAVE YOU ACTUALLY HAD ANY THOUGHTS OF KILLING YOURSELF?: NO
1. IN THE PAST MONTH, HAVE YOU WISHED YOU WERE DEAD OR WISHED YOU COULD GO TO SLEEP AND NOT WAKE UP?: NO

## 2025-05-21 ENCOUNTER — APPOINTMENT (OUTPATIENT)
Dept: DERMATOLOGY | Facility: CLINIC | Age: 75
End: 2025-05-21
Payer: MEDICARE

## 2025-05-21 DIAGNOSIS — C44.92 SQUAMOUS CELL CARCINOMA OF SKIN: ICD-10-CM

## 2025-05-21 NOTE — PROGRESS NOTES
Office Visit Note  Date: 5/21/2025  Surgeon:  Benjamin Guerrero MD PhD  Office Location: [address] [address]  Dept: [phone]  Dept Fax: [phone]  Referring Provider: Moe Worley MD  [address] [facility] [address], [address] [place] [address]    Subjective   Gavin Hawkins Jr. is a 75 y.o. male who presents for the following: MOHS Surgery (Mid forehead, SCC)    According to the patient, the lesion has been present for approximately 6 months at the time of diagnosis.  The lesion is not causing symptoms.  The lesion has not been treated previously.    The patient does have a pacemaker / defibrillator.  The patient does have a heart valve / joint replacement.    The patient is not on blood thinners.  The patient does not have a history of hepatitis B or C.  The patient does not have a history of HIV.  The patient does not have a history of immunosuppression (e.g. organ transplantation, malignancy, medications)    Review of Systems:  No other skin or systemic complaints other than what is documented elsewhere in the note.    MEDICAL HISTORY: clinically relevant history including significant past medical history, medications and allergies was reviewed and documented in Epic.    Objective   Well appearing patient in no apparent distress; mood and affect are within normal limits.  Vital signs: See record.  Noted on the   Mid Forehead  Is a 0.5 x 1 cm scar      The patient confirmed the identified site.    Discussion:  The nature of the diagnosis was explained. The lesion is a skin cancer.  It has a risk of local growth and distant spread. The condition is associated with sun exposure.  Warning signs of non-melanoma skin cancer discussed. Patient was instructed to perform monthly self skin examination.  We recommended that the patient have regular full skin exams given an increased risk of subsequent skin cancers. The patient was instructed to use  sun protective behaviors including use of broad spectrum sunscreens and sun protective clothing to reduce risk of skin cancers.      Risks, benefits, side effects of Mohs surgery were discussed with patient and the patient voiced understanding.  It was explained that even though the cure rate of Mohs is very high it is not 100%. Risks of surgery including but not limited to bleeding, infection, numbness, nerve damage, and scar were reviewed.  Discussion included wound care requirements, activity restrictions, likely scar outcome and time to heal.     After Mohs surgery, the defect may need to be repaired surgically and the scar may be longer than the original lesion.  Reconstruction options, risks, and benefits were reviewed including second intention healing, linear repair (4-1 ratio was explained), local flaps, skin grafts, cartilage grafts and interpolation flaps (the need for multiple surgeries was explained). Possible outcomes were reviewed including likely scar appearance, failure of flap survival, infection, bleeding and the need for revision surgery.     The pathology was reviewed, the photograph was reviewed, and the referring physician's note was reviewed.    Patient elected for Mohs surgery.      IDariana RN am scribing for, and in the presence of Benjamin Guerrero MD PhD    Benjamin URIOSTEGUI MD, PhD, personally performed the services described in the documentation as scribed by Dariana Malhotra RN in my presence, and confirm it is both accurate and complete.

## 2025-05-21 NOTE — PROGRESS NOTES
Mohs Surgery Operative Note    Date of Surgery:  5/21/2025  Surgeon:  Benjamin Guerrero MD PhD  Office Location: [address] [address]  Dept: [phone]  Dept Fax: [phone]  Referring Provider: Moe Worley MD  [address] [place] [address], [place] [id number]      Assessment/Plan   Pre-procedure:   Obtained informed consent: written from patient  The surgical site was identified and confirmed with the patient.     Intra-operative:   Audible time out called at 8:35 AM 05/21/25  by: Dariana Malhotra RN   Verified patient name, birthdate, site, specimen bottle label & requisition.    The planned procedure(s) was again reviewed with the patient. The risks of bleeding, infection, nerve damage and scarring were reviewed. Written authorization was obtained. The patient identity, surgical site, and planned procedure(s) were verified. The provider acted as both surgeon and pathologist.     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  Mohs surgery    Consent obtained: written    Universal Protocol:  Procedure explained and questions answered to patient or proxy's satisfaction: Yes    Test results available and properly labeled: Yes    Pathology report reviewed: Yes    External notes reviewed: Yes    Photo or diagram used for site identification: Yes    Site/side marked: Yes    Slide independently reviewed by Mohs surgeon: Yes    Immediately prior to procedure a time out was called: Yes    Patient identity confirmed: verbally with patient  Preparation: Patient was prepped and draped in usual sterile fashion      Anticoagulation:  Is the patient taking prescription anticoagulant and/or aspirin prescribed/recommended by a physician? No    Was the anticoagulation regimen changed prior to Mohs? No      Anesthesia:  Anesthesia method: local infiltration  Local anesthetic: lidocaine 1% WITH epi    Procedure Details:  Case ID Number: [id number]  Biopsy  accession number: [id number]  Date of biopsy: 3/27/2025  Frozen section biopsy performed: No    Specimen debulked: Yes (Scar)    Pre-Op diagnosis: squamous cell carcinoma  Surgical site (from skin exam): Mid Forehead  Pre-operative length (cm): 1  Pre-operative width (cm): 0.5  Indications for Mohs surgery: anatomic location where tissue conservation is critical  Previously treated? No      Micrographic Surgery Details:  Post-operative length (cm): 1.2  Post-operative width (cm): 1  Number of Mohs stages: 1    Stage 1     Comments: The patient was brought into the operating room and placed in the procedure chair in the appropriate position.  The area positive by previous biopsy was identified and confirmed with the patient. The area of clinically obvious tumor was debulked using a curette and/or scalpel as needed. An incision was made following the Mohs approach through the skin. The specimen was taken to the lab, divided into 2 piece(s) and appropriately chromacoded and processed.          AK seen on lateral edge of piece 1.  Will follow up with medical management.     Tumor features identified on Mohs section: no tumor identified    Depth of defect: subcutaneous fat    Patient tolerance of procedure: tolerated well, no immediate complications    Reconstruction:  Was the defect reconstructed? Yes    Was reconstruction performed by the same Mohs surgeon? Yes    Setting of reconstruction: outpatient office  When was reconstruction performed? same day  Type of reconstruction: linear  Linear reconstruction: complex  Length of linear repair (cm): 4  Subcutaneous Layers (Deep Stitches)   Suture size:  5-0  Suture type:  Vicryl  Stitches:  Buried vertical mattress  Fine/surface layer approximation (top stitches)   Epidermal/Superficial suture size:  5-0  Epidermal/Superficial suture type:  Fast-absorbing gut  Stitches: simple running    Hemostasis achieved with: electrodesiccation  Outcome: patient tolerated procedure well  with no complications    Post-procedure details: sterile dressing applied and wound care instructions given    Dressing type: Hypafix, pressure dressing, Telfa pad and petrolatum      Complex Linear Repair - Wide Undermining:  Given the location and size of the defect, it was determined that a complex layered linear closure was required to restore normal anatomy and function. The repair was considered complex because extensive undermining was required and performed. The amount of undermining performed was greater than the maximum width of the defect as measured perpendicular to the closure line along at least one entire edge of the defect. Standing cutaneous cones were removed using Burow's triangles. The wound edges were brought into close approximation with buried vertical mattress sutures. The remainder of the wound was then closed with epidermal top sutures.              The final repair measured 4 cm    Wound care was discussed, and the patient was given written post-operative wound care instructions.      The patient will follow up with Benjamin Guerrero MD PhD as needed for any post operative problems or concerns, and will follow up with their primary dermatologist as scheduled.       IDariana RN am scribing for, and in the presence of Benjamin Guerrero MD PhD    Benjamin URIOSTEGUI MD, PhD, personally performed the services described in the documentation as scribed by Dariana Malhotra RN in my presence, and confirm it is both accurate and complete.

## 2025-05-23 ENCOUNTER — APPOINTMENT (OUTPATIENT)
Facility: CLINIC | Age: 75
End: 2025-05-23
Payer: MEDICARE

## 2025-05-23 VITALS
SYSTOLIC BLOOD PRESSURE: 119 MMHG | DIASTOLIC BLOOD PRESSURE: 77 MMHG | RESPIRATION RATE: 18 BRPM | WEIGHT: 213.6 LBS | OXYGEN SATURATION: 97 % | HEIGHT: 69 IN | HEART RATE: 86 BPM | BODY MASS INDEX: 31.64 KG/M2

## 2025-05-23 DIAGNOSIS — R91.1 LUNG NODULE, SOLITARY: ICD-10-CM

## 2025-05-23 DIAGNOSIS — R93.89 ABNORMAL CHEST CT: ICD-10-CM

## 2025-05-23 DIAGNOSIS — R91.8 LUNG NODULES: Primary | ICD-10-CM

## 2025-05-23 DIAGNOSIS — R91.1 INCIDENTAL LUNG NODULE, > 3MM AND < 8MM: ICD-10-CM

## 2025-05-23 PROCEDURE — 1036F TOBACCO NON-USER: CPT

## 2025-05-23 PROCEDURE — 3078F DIAST BP <80 MM HG: CPT

## 2025-05-23 PROCEDURE — 99204 OFFICE O/P NEW MOD 45 MIN: CPT

## 2025-05-23 PROCEDURE — 3074F SYST BP LT 130 MM HG: CPT

## 2025-05-23 PROCEDURE — 1159F MED LIST DOCD IN RCRD: CPT

## 2025-05-23 PROCEDURE — G8433 SCR FOR DEP NOT CPT DOC RSN: HCPCS

## 2025-05-23 ASSESSMENT — PATIENT HEALTH QUESTIONNAIRE - PHQ9
1. LITTLE INTEREST OR PLEASURE IN DOING THINGS: NOT AT ALL
2. FEELING DOWN, DEPRESSED OR HOPELESS: NOT AT ALL
SUM OF ALL RESPONSES TO PHQ9 QUESTIONS 1 AND 2: 0

## 2025-05-23 ASSESSMENT — ENCOUNTER SYMPTOMS
DEPRESSION: 0
LOSS OF SENSATION IN FEET: 1
OCCASIONAL FEELINGS OF UNSTEADINESS: 0

## 2025-05-23 ASSESSMENT — COLUMBIA-SUICIDE SEVERITY RATING SCALE - C-SSRS
1. IN THE PAST MONTH, HAVE YOU WISHED YOU WERE DEAD OR WISHED YOU COULD GO TO SLEEP AND NOT WAKE UP?: NO
2. HAVE YOU ACTUALLY HAD ANY THOUGHTS OF KILLING YOURSELF?: NO
6. HAVE YOU EVER DONE ANYTHING, STARTED TO DO ANYTHING, OR PREPARED TO DO ANYTHING TO END YOUR LIFE?: NO

## 2025-05-29 PROCEDURE — RXMED WILLOW AMBULATORY MEDICATION CHARGE

## 2025-06-02 ENCOUNTER — PHARMACY VISIT (OUTPATIENT)
Dept: PHARMACY | Facility: CLINIC | Age: 75
End: 2025-06-02
Payer: COMMERCIAL

## 2025-06-10 LAB
BASOPHILS # BLD AUTO: 59 CELLS/UL (ref 0–200)
BASOPHILS NFR BLD AUTO: 0.9 %
EOSINOPHIL # BLD AUTO: 383 CELLS/UL (ref 15–500)
EOSINOPHIL NFR BLD AUTO: 5.8 %
ERYTHROCYTE [DISTWIDTH] IN BLOOD BY AUTOMATED COUNT: 12.7 % (ref 11–15)
FERRITIN SERPL-MCNC: 43 NG/ML (ref 24–380)
FOLATE SERPL-MCNC: >24 NG/ML
HCT VFR BLD AUTO: 38.5 % (ref 38.5–50)
HGB BLD-MCNC: 12.4 G/DL (ref 13.2–17.1)
IRON SATN MFR SERPL: 19 % (CALC) (ref 20–48)
IRON SERPL-MCNC: 60 MCG/DL (ref 50–180)
LYMPHOCYTES # BLD AUTO: 1577 CELLS/UL (ref 850–3900)
LYMPHOCYTES NFR BLD AUTO: 23.9 %
MCH RBC QN AUTO: 28.4 PG (ref 27–33)
MCHC RBC AUTO-ENTMCNC: 32.2 G/DL (ref 32–36)
MCV RBC AUTO: 88.3 FL (ref 80–100)
MONOCYTES # BLD AUTO: 561 CELLS/UL (ref 200–950)
MONOCYTES NFR BLD AUTO: 8.5 %
NEUTROPHILS # BLD AUTO: 4019 CELLS/UL (ref 1500–7800)
NEUTROPHILS NFR BLD AUTO: 60.9 %
PLATELET # BLD AUTO: 186 THOUSAND/UL (ref 140–400)
PMV BLD REES-ECKER: 10.4 FL (ref 7.5–12.5)
RBC # BLD AUTO: 4.36 MILLION/UL (ref 4.2–5.8)
RETICS #: NORMAL CELLS/UL (ref 25000–90000)
RETICS/RBC NFR AUTO: 1.3 %
TIBC SERPL-MCNC: 323 MCG/DL (CALC) (ref 250–425)
VIT B12 SERPL-MCNC: 905 PG/ML (ref 200–1100)
WBC # BLD AUTO: 6.6 THOUSAND/UL (ref 3.8–10.8)

## 2025-06-17 DIAGNOSIS — K92.2 UPPER GASTROINTESTINAL BLEED: ICD-10-CM

## 2025-06-18 RX ORDER — OMEPRAZOLE 40 MG/1
40 CAPSULE, DELAYED RELEASE ORAL
Qty: 90 CAPSULE | Refills: 3 | Status: SHIPPED | OUTPATIENT
Start: 2025-06-18

## 2025-06-18 NOTE — PROGRESS NOTES
Clinical Pharmacy Appointment    Patient ID: Gavin Hawkins Jr. is a 75 y.o. male who presents for Diabetes.    Pt is here for Follow Up appointment.     Referring Provider: Salvatore Madera DO  PCP: Salvatore Madera DO  Last visit with PCP: 4/25/25   Next visit with PCP: 6/26/25    Subjective   Drug Interactions  No relevant drug interactions were noted.     Medication System Management  Patient's preferred pharmacy: [facility],  [facility]   Adherence/Organization: No concerns  Affordability/Accessibility:  PAP for ozempic      Patient Assistance for Ozempic and Myrbetriq approved through 11/4/25. Will have to be renewed prior to that date to prevent lapse in coverage. Medication(s) will be received at no cost to patient from [facility].      PMHx: Myasthenia gravis, TRUPTI, obesity, HLD, HTN, GERD, BPH      HPI  DIABETES MELLITUS Type 2:    Follows with Endocrinology?: No     Pertinent PMH Review  Known diabetic complications:   Peripheral neuropathy  Obesity  Hx or FH Hx of MTC/MEN2?: No  Pancreatitis?: No  Gastroparesis?: No  UTI/Yeast Infections?: No     Pharmacological Therapy  Current Medications:   Ozempic 2mg weekly  Metformin 500mg BID  Previous Medications: Pioglitazone (weight gain, therapy completed), Januvia        Clarifications to above regimen: None  Adverse Effects: None noted     Glucose Readings  Glucometer/CGM Type: OneTouch Delica  Patient tests BG 1 times per day     Previous home BG readings: FBG now 120-130's after metformin decrease   Current home BG readings: FBG now low 120's     Any episodes of hypoglycemia? No   Did patient treat episode of hypoglycemia appropriately? N/A  Does the patient have a prescription for ready-to-use Glucagon? No,       Lifestyle  Diet: No significant changes  Physical Activity: No significant changes     Risk Reducing Medications  Statin? Yes, atorvastatin 40mg  ACE-I/ARB? Yes, losartan 100mg     Preventative Care  Diabetic  Eye Exam: Needs completed, last 2023  Monofilament Foot Exam: Following with podiatry    Objective   Allergies[1]  Social History     Social History Narrative    Not on file      Medication Review  Current Outpatient Medications   Medication Instructions    amLODIPine (NORVASC) 5 mg, oral, Daily, for blood pressure    ascorbic acid, vitamin C, 500 mg capsule Take by mouth.    atorvastatin (LIPITOR) 40 mg, oral, Daily    azelastine (Astelin) 137 mcg (0.1 %) nasal spray 2 sprays, Each Nostril, 2 times daily    fluticasone (Flonase) 50 mcg/actuation nasal spray 2 sprays, Each Nostril, Daily    L.acidoph-L.rhamn-B.bif-B.long (Probiotic Acidophilus [employer]) 12.9 mg (2 billion cell) tablet,delayed release (DR/EC) 1 capsule, oral, Daily    losartan-hydrochlorothiazide (Hyzaar) 100-25 mg tablet 1 tablet, oral, Daily    metFORMIN (GLUCOPHAGE) 500 mg, oral, 2 times daily (morning and late afternoon)    Myrbetriq 25 mg, oral, Daily    omeprazole (PRILOSEC) 40 mg, oral, Daily before breakfast    ONETOUCH DELICA LANCETS MISC Fine; Use 1 to check glucose once daily    OneTouch Ultra Test strip USE 1 STRIP TO CHECK GLUCOSE ONCE DAILY    Ozempic 2 mg, subcutaneous, Weekly    pyridostigmine (MESTINON) 60 mg, oral, 4 times daily, 4 times daily    vit A,C and E-lutein-minerals (Ocuvite with Lutein) 300 mcg-200 mg-27 mg-2 mg tablet Take by mouth.    vitamin B complex tablet Super B complex tabs      Vitals  BP Readings from Last 2 Encounters:   05/23/25 119/77   05/20/25 118/82     BMI Readings from Last 1 Encounters:   05/23/25 31.54 kg/m²      Labs  A1C  Lab Results   Component Value Date    HGBA1C 7.1 (A) 04/25/2025    HGBA1C 5.8 01/23/2025    HGBA1C 5.9 08/16/2024     BMP  Lab Results   Component Value Date    CALCIUM 9.8 03/28/2025     03/28/2025    K 4.2 03/28/2025    CO2 27 03/28/2025     03/28/2025    BUN 17 03/28/2025    CREATININE 0.87 03/28/2025    EGFR 90 03/28/2025     LFTs  Lab Results   Component Value Date     ALT 15 03/28/2025    AST 14 03/28/2025    ALKPHOS 70 03/28/2025    BILITOT 0.6 03/28/2025     FLP  Lab Results   Component Value Date    TRIG 93 03/28/2025    CHOL 106 03/28/2025    LDLF 68 06/26/2023    LDLCALC 35 03/28/2025    HDL 53 03/28/2025     Urine Microalbumin  Lab Results   Component Value Date    MICROALBCREA 20.2 10/03/2023     Weight Management  Wt Readings from Last 3 Encounters:   05/23/25 96.9 kg (213 lb 9.6 oz)   05/20/25 97.1 kg (214 lb)   04/25/25 95.7 kg (211 lb)      There is no height or weight on file to calculate BMI.     Assessment/Plan   Problem List Items Addressed This Visit       Controlled diabetes mellitus with diabetic neuropathy    Relevant Orders    Referral to Clinical Pharmacy   Patient's goal A1c is < 7%.  Is pt at goal? No, 7.1%  Patient's SMBGs are stable, higher than last month, still within range.  Rationale for plan: Due to PCP and patient preference, tolerability, further glycemic control needed, plan to continue current medications. Patient reports continual efficacy with Myrbetriq. Patient prefers 2 month supplies of medication. Will follow up for A1c recheck, and consider discontinuing metformin.     Medication Changes:  CONTINUE  Metformin 1000mg, take 0.5 tabs twice daily  Myrbetriq ER 25mg once daily  Ozempic 2mg once weekly    Monitoring and Education:  Counseled patient on relevant medication mechanisms of action, expectations, side effects, duration of therapy, contraindications, administration, and monitoring parameters.  All questions and concerns addressed. Contact pharmacist with any further questions or concerns prior to next appointment.  Reviewed BG goals: Fasting BG goal , Postprandial BG goal <180 mg/dL, A1C goal <7%%    Clinical Pharmacist follow-up: 7/21/25 at 10AM, Telehealth visit    Continue all meds under the continuation of care with the referring provider and clinical pharmacy team.    Thank you,  Clarice Garcia  Pharmacy Resident    Verbal  consent to manage patient's drug therapy was obtained from the patient. They were informed they may decline to participate or withdraw from participation in pharmacy services at any time.       [1]   Allergies  Allergen Reactions    Ciprofloxacin Other     Myasthenia Gravis

## 2025-06-23 ENCOUNTER — APPOINTMENT (OUTPATIENT)
Dept: PHARMACY | Facility: HOSPITAL | Age: 75
End: 2025-06-23
Payer: MEDICARE

## 2025-06-23 DIAGNOSIS — E11.40 CONTROLLED TYPE 2 DIABETES MELLITUS WITH DIABETIC NEUROPATHY, WITHOUT LONG-TERM CURRENT USE OF INSULIN: ICD-10-CM

## 2025-06-23 PROCEDURE — RXMED WILLOW AMBULATORY MEDICATION CHARGE

## 2025-06-26 ENCOUNTER — APPOINTMENT (OUTPATIENT)
Dept: PRIMARY CARE | Facility: CLINIC | Age: 75
End: 2025-06-26
Payer: MEDICARE

## 2025-06-26 ENCOUNTER — PHARMACY VISIT (OUTPATIENT)
Dept: PHARMACY | Facility: CLINIC | Age: 75
End: 2025-06-26
Payer: COMMERCIAL

## 2025-06-26 VITALS
HEIGHT: 69 IN | WEIGHT: 213 LBS | OXYGEN SATURATION: 98 % | BODY MASS INDEX: 31.55 KG/M2 | HEART RATE: 82 BPM | RESPIRATION RATE: 16 BRPM | SYSTOLIC BLOOD PRESSURE: 120 MMHG | DIASTOLIC BLOOD PRESSURE: 76 MMHG

## 2025-06-26 DIAGNOSIS — E66.811 OBESITY (BMI 30.0-34.9): ICD-10-CM

## 2025-06-26 DIAGNOSIS — K21.9 GASTROESOPHAGEAL REFLUX DISEASE WITHOUT ESOPHAGITIS: ICD-10-CM

## 2025-06-26 DIAGNOSIS — G47.33 OSA (OBSTRUCTIVE SLEEP APNEA): ICD-10-CM

## 2025-06-26 DIAGNOSIS — C43.59 MALIGNANT MELANOMA OF TORSO EXCLUDING BREAST: ICD-10-CM

## 2025-06-26 DIAGNOSIS — C44.42 SQUAMOUS CELL CANCER OF SKIN OF CROWN: ICD-10-CM

## 2025-06-26 DIAGNOSIS — E78.2 MIXED HYPERLIPIDEMIA: ICD-10-CM

## 2025-06-26 DIAGNOSIS — R97.20 BPH WITH ELEVATED PSA: ICD-10-CM

## 2025-06-26 DIAGNOSIS — E11.40 CONTROLLED TYPE 2 DIABETES MELLITUS WITH DIABETIC NEUROPATHY, WITHOUT LONG-TERM CURRENT USE OF INSULIN: ICD-10-CM

## 2025-06-26 DIAGNOSIS — R41.3 MEMORY LOSS OR IMPAIRMENT: ICD-10-CM

## 2025-06-26 DIAGNOSIS — N40.0 BPH WITH ELEVATED PSA: ICD-10-CM

## 2025-06-26 DIAGNOSIS — I10 PRIMARY HYPERTENSION: Primary | ICD-10-CM

## 2025-06-26 PROBLEM — L03.019 PARONYCHIA OF FINGER: Status: RESOLVED | Noted: 2022-12-15 | Resolved: 2025-06-26

## 2025-06-26 PROBLEM — R26.89 POOR BALANCE: Status: RESOLVED | Noted: 2023-02-09 | Resolved: 2025-06-26

## 2025-06-26 PROCEDURE — G2211 COMPLEX E/M VISIT ADD ON: HCPCS | Performed by: INTERNAL MEDICINE

## 2025-06-26 PROCEDURE — 3078F DIAST BP <80 MM HG: CPT | Performed by: INTERNAL MEDICINE

## 2025-06-26 PROCEDURE — 1036F TOBACCO NON-USER: CPT | Performed by: INTERNAL MEDICINE

## 2025-06-26 PROCEDURE — 3074F SYST BP LT 130 MM HG: CPT | Performed by: INTERNAL MEDICINE

## 2025-06-26 PROCEDURE — 1160F RVW MEDS BY RX/DR IN RCRD: CPT | Performed by: INTERNAL MEDICINE

## 2025-06-26 PROCEDURE — 1126F AMNT PAIN NOTED NONE PRSNT: CPT | Performed by: INTERNAL MEDICINE

## 2025-06-26 PROCEDURE — 99214 OFFICE O/P EST MOD 30 MIN: CPT | Performed by: INTERNAL MEDICINE

## 2025-06-26 PROCEDURE — 1159F MED LIST DOCD IN RCRD: CPT | Performed by: INTERNAL MEDICINE

## 2025-06-26 RX ORDER — ATORVASTATIN CALCIUM 20 MG/1
20 TABLET, FILM COATED ORAL DAILY
Qty: 90 TABLET | Refills: 3 | Status: SHIPPED | OUTPATIENT
Start: 2025-06-26 | End: 2026-06-26

## 2025-06-26 RX ORDER — AMLODIPINE BESYLATE 5 MG/1
2.5 TABLET ORAL DAILY
Qty: 90 TABLET | Refills: 3 | Status: SHIPPED | OUTPATIENT
Start: 2025-06-26

## 2025-06-26 ASSESSMENT — PAIN SCALES - GENERAL: PAINLEVEL_OUTOF10: 0-NO PAIN

## 2025-06-26 NOTE — ASSESSMENT & PLAN NOTE
Orders:    Referral to Nutrition Services; Future    atorvastatin (Lipitor) 20 mg tablet; Take 1 tablet (20 mg) by mouth once daily.

## 2025-06-26 NOTE — PROGRESS NOTES
Subjective   Gavin Hawkins Jr. is a 75 y.o. male who presents for Follow-up (Test results /Reports that he has been taking Ozempic and losing weight but has noticed he is losing muscle mass also /Reports that he is having memory lapse for the last couple months, reports that others are noticing that his memory is not as good as it was ).      History of Present Illness  The patient presents for evaluation of hypertension, hyperlipidemia, diabetes, anemia, sleep apnea, memory loss, and skin cancer.    Skin Cancer  He has a history of squamous cell carcinoma, which was treated with Mohs surgery on his forehead. The surgical site has healed well. He is scheduled for a 6-month follow-up with his dermatologist next month. His treatment regimen includes the application of topical creams and cryotherapy as needed. He also has a history of melanoma, with one lesion excised from his back 20 years ago and another Mohs surgery performed on the side of his head. He undergoes regular dermatological check-ups every 6 months.  - Onset: History of squamous cell carcinoma and melanoma.  - Location: Forehead, back, side of head.  - Duration: Melanoma excised 20 years ago; regular check-ups every 6 months.  - Character: Treated with Mohs surgery, topical creams, and cryotherapy.  - Timing: Scheduled for a 6-month follow-up next month.    Hypertension  He has been diagnosed with hypertension. He reports no edema in his hands or feet and notes that his blood pressure readings have been within the normal range. He is currently on amlodipine for blood pressure management.  - Onset: Diagnosed with hypertension.  - Character: No edema in hands or feet; blood pressure readings within normal range.  - Alleviating Factors: Amlodipine for blood pressure management.    Diabetes  He has a diagnosis of diabetes but is not insulin-dependent. He recently reordered his Ozempic medication and is up-to-date with all his other medications. He is also  on metformin.  - Onset: Diagnosed with diabetes.  - Character: Not insulin-dependent.  - Alleviating Factors: Ozempic and metformin.    Sleep Apnea  He has a diagnosis of sleep apnea and uses a CPAP mask. He was prescribed nasal sprays, which he recently renewed. These sprays have improved his sleep quality and reduced congestion, but he continues to experience postnasal drip. He uses azelastine nasal spray at night and Flonase once daily.  - Onset: Diagnosed with sleep apnea.  - Character: Uses CPAP mask; nasal sprays improve sleep quality and reduce congestion.  - Alleviating Factors: Azelastine nasal spray at night and Flonase once daily.    Weight Loss  He has experienced significant weight loss over time, dropping from 250 pounds to 209 pounds. He has been adhering to a vegetarian diet and has consulted a nutritionist. His wife has expressed concern about potential muscle mass loss due to his weight loss. He has been unable to achieve his target weight of 200 pounds, with his weight fluctuating between 210 and 212 pounds. Despite an increase in his Ozempic dosage, he has not noticed any significant weight loss or experienced diarrhea.  - Onset: Significant weight loss over time.  - Character: Weight dropped from 250 pounds to 209 pounds; vegetarian diet; weight fluctuates between 210 and 212 pounds.  - Alleviating Factors: Consulted a nutritionist; increased Ozempic dosage.    Memory Loss  He has been experiencing memory lapses, which have become noticeable to others beyond his immediate family.  - Onset: Memory lapses noticeable to others.    Hyperlipidemia  He is currently on atorvastatin for cholesterol management.  - Alleviating Factors: Atorvastatin for cholesterol management.    Anemia  He is anemic.    Colonoscopy  He had a colonoscopy done on 05/09/2023, which was completely normal. He had one polyp in the transverse colon. He is on Mestinon 4 times a day for myasthenia gravis.    PAST SURGICAL  "HISTORY:  - Mohs surgery on forehead for squamous cell carcinoma  - Excision of melanoma from back 20 years ago  - Mohs surgery on side of head for melanoma      Review of Systems    Objective   /76   Pulse 82   Resp 16   Ht 1.753 m (5' 9\")   Wt 96.6 kg (213 lb)   SpO2 98%   BMI 31.45 kg/m²       Physical Exam    Assessment & Plan  Primary hypertension    Orders:    amLODIPine (Norvasc) 5 mg tablet; Take 0.5 tablets (2.5 mg) by mouth once daily. for blood pressure    Controlled type 2 diabetes mellitus with diabetic neuropathy, without long-term current use of insulin    Orders:    Referral to Nutrition Services; Future    atorvastatin (Lipitor) 20 mg tablet; Take 1 tablet (20 mg) by mouth once daily.    Squamous cell cancer of skin of crown         Gastroesophageal reflux disease without esophagitis         Malignant melanoma of torso excluding breast  Follows with dermatology           TRUPTI (obstructive sleep apnea)         BPH with elevated PSA    Orders:    PSA, total and free; Future    Memory loss or impairment    Orders:    Referral to Nutrition Services; Future    Referral to Neurology; Future    Referral to Neurology; Future    Obesity (BMI 30.0-34.9)         Mixed hyperlipidemia    Orders:    atorvastatin (Lipitor) 20 mg tablet; Take 1 tablet (20 mg) by mouth once daily.       Assessment & Plan  1. Hypertension: Stable.  - Blood pressure readings are within the normal range.  - No swelling of hands or feet reported.  - Discussed potential nasal congestion caused by vasodilators.  - Amlodipine dosage will be reduced to half a tablet daily.    2. Hyperlipidemia.  - Cholesterol levels are satisfactory, with an LDL of 35.  - Goal is to increase LDL level to around 70.  - Atorvastatin dosage will be decreased to 20 mg.    3. Diabetes Mellitus.  - Currently on Ozempic (semaglutide) and metformin.  - Weight loss noted, down to 209 lbs from 250 lbs.  - Consultation with a nutritionist will be arranged " to address protein intake and weight loss.  - Current regimen will be maintained.    4. Anemia.  - Hemoglobin level is slightly below normal at 12.6 g/dL.  - Iron levels are at the lower end of normal but not low enough to cause anemia.  - Reticulocyte count, folic acid, and vitamin B12 levels are within normal limits.  - Continued monitoring of hemoglobin levels is recommended.    5. Sleep Apnea.  - Uses a CPAP mask and reports good compliance.  - Using azelastine nasal spray at night and Flonase once a day.    6. Memory Loss.  - Concerns about memory lapses noted by patient and others.  - Referral to Dr. Cha for neurocognitive testing will be made.    7. Skin Cancer: History of squamous cell carcinoma and melanoma.  - Regular dermatology follow-ups every six months.    8. Health Maintenance.  - PSA level was normal in August 2024.  - PSA test will be ordered in the next 3 months to maintain annual monitoring.    Follow-up  - PSA test in the next 3 months.      Salvatore Madera,    This medical note was created with the assistance of artificial intelligence (AI) for documentation purposes. The content has been reviewed and confirmed by the healthcare provider for accuracy and completeness. Patient consented to the use of audio recording and use of AI during their visit.

## 2025-06-26 NOTE — ASSESSMENT & PLAN NOTE
Orders:    amLODIPine (Norvasc) 5 mg tablet; Take 0.5 tablets (2.5 mg) by mouth once daily. for blood pressure

## 2025-06-30 LAB
PSA FREE MFR SERPL: 21 % (CALC)
PSA FREE SERPL-MCNC: 0.6 NG/ML
PSA SERPL-MCNC: 2.9 NG/ML

## 2025-07-02 ENCOUNTER — APPOINTMENT (OUTPATIENT)
Dept: NEUROLOGY | Facility: CLINIC | Age: 75
End: 2025-07-02
Payer: MEDICARE

## 2025-07-10 ENCOUNTER — APPOINTMENT (OUTPATIENT)
Dept: PRIMARY CARE | Facility: CLINIC | Age: 75
End: 2025-07-10
Payer: MEDICARE

## 2025-07-18 NOTE — PROGRESS NOTES
"  Clinical Pharmacy Appointment    Patient ID: Gavin Hawkins Jr. \"Timothy" is a 75 y.o. male who presents for Diabetes.    Pt is here for Follow Up appointment.     Referring Provider: Salvatore Madera DO  PCP: Salvatore Madera DO  Last visit with PCP: 6/26/25   Next visit with PCP: 11/6/25    Subjective   Drug Interactions  No relevant drug interactions were noted.     Medication System Management  Patient's preferred pharmacy: [facility],  [facility] home delivery   Adherence/Organization: No concerns  Affordability/Accessibility:  PAP for ozempic      Patient Assistance for Ozempic and Myrbetriq approved through 11/4/25. Will have to be renewed prior to that date to prevent lapse in coverage. Medication(s) will be received at no cost to patient from [facility].      PMHx: Myasthenia gravis, TRUPTI, obesity, HLD, HTN, GERD, BPH      HPI  DIABETES MELLITUS Type 2:    Follows with Endocrinology?: No     Pertinent PMH Review  Known diabetic complications:   Peripheral neuropathy  Obesity  Hx or FH Hx of MTC/MEN2?: No  Pancreatitis?: No  Gastroparesis?: No  UTI/Yeast Infections?: No     Pharmacological Therapy  Current Medications:   Ozempic 2mg weekly  Metformin 500mg BID  Previous Medications: Pioglitazone (weight gain, therapy completed), Januvia        Clarifications to above regimen: None  Adverse Effects: None noted      Glucose Readings  Glucometer/CGM Type: OneTouch Delica  Patient tests BG 1 times per day     Previous home BG readings: FBG now low 120's   Current home BG readings: 105 this morning, typically around low 120s     Any episodes of hypoglycemia? No   Did patient treat episode of hypoglycemia appropriately? N/A  Does the patient have a prescription for ready-to-use Glucagon? No,       Lifestyle  Diet: No significant changes  Physical Activity: No significant changes     Risk Reducing Medications  Statin? Yes, atorvastatin 40mg  ACE-I/ARB? Yes, losartan 100mg     Preventative " Care  Diabetic Eye Exam: Needs completed, last 2023  Monofilament Foot Exam: Following with podiatry    Objective   Allergies[1]  Social History     Social History Narrative    Not on file      Medication Review  Current Outpatient Medications   Medication Instructions    amLODIPine (NORVASC) 2.5 mg, oral, Daily, for blood pressure    ascorbic acid, vitamin C, 500 mg capsule Take by mouth.    atorvastatin (LIPITOR) 20 mg, oral, Daily    azelastine (Astelin) 137 mcg (0.1 %) nasal spray 2 sprays, Each Nostril, 2 times daily    fluticasone (Flonase) 50 mcg/actuation nasal spray 2 sprays, Each Nostril, Daily    L.acidoph-L.rhamn-B.bif-B.long (Probiotic Acidophilus Biobeads) 12.9 mg (2 billion cell) tablet,delayed release (DR/EC) 1 capsule, oral, Daily    losartan-hydrochlorothiazide (Hyzaar) 100-25 mg tablet 1 tablet, oral, Daily    metFORMIN (GLUCOPHAGE) 500 mg, oral, 2 times daily (morning and late afternoon)    Myrbetriq 25 mg, oral, Daily    omeprazole (PRILOSEC) 40 mg, oral, Daily before breakfast    ONETOUCH DELICA LANCETS MISC Fine; Use 1 to check glucose once daily    OneTouch Ultra Test strip USE 1 STRIP TO CHECK GLUCOSE ONCE DAILY    Ozempic 2 mg, subcutaneous, Weekly    pyridostigmine (MESTINON) 60 mg, oral, 4 times daily, 4 times daily    vit A,C and E-lutein-minerals (Ocuvite with Lutein) 300 mcg-200 mg-27 mg-2 mg tablet Take by mouth.    vitamin B complex tablet Super B complex tabs      Vitals  BP Readings from Last 2 Encounters:   06/26/25 120/76   05/23/25 119/77     BMI Readings from Last 1 Encounters:   06/26/25 31.45 kg/m²      Labs  A1C  Lab Results   Component Value Date    HGBA1C 7.1 (A) 04/25/2025    HGBA1C 5.8 01/23/2025    HGBA1C 5.9 08/16/2024     BMP  Lab Results   Component Value Date    CALCIUM 9.8 03/28/2025     03/28/2025    K 4.2 03/28/2025    CO2 27 03/28/2025     03/28/2025    BUN 17 03/28/2025    CREATININE 0.87 03/28/2025    EGFR 90 03/28/2025     LFTs  Lab Results    Component Value Date    ALT 15 03/28/2025    AST 14 03/28/2025    ALKPHOS 70 03/28/2025    BILITOT 0.6 03/28/2025     FLP  Lab Results   Component Value Date    TRIG 93 03/28/2025    CHOL 106 03/28/2025    LDLF 68 06/26/2023    LDLCALC 35 03/28/2025    HDL 53 03/28/2025     Urine Microalbumin  Lab Results   Component Value Date    MICROALBCREA 20.2 10/03/2023     Weight Management  Wt Readings from Last 3 Encounters:   06/26/25 96.6 kg (213 lb)   05/23/25 96.9 kg (213 lb 9.6 oz)   05/20/25 97.1 kg (214 lb)      There is no height or weight on file to calculate BMI.     Assessment/Plan   Problem List Items Addressed This Visit       Controlled diabetes mellitus with diabetic neuropathy - Primary    Relevant Orders    Referral to Clinical Pharmacy    Urge incontinence of urine    Relevant Orders    Referral to Clinical Pharmacy     Patient's goal A1c is < 7%.  Is pt at goal? No, 7.1%  Patient's SMBGs are stable and within range.  Rationale for plan: Due to PCP and patient preference, tolerability, further glycemic control needed, plan to continue current medications. Patient reports continual efficacy with Myrbetriq and may schedule an appointment with a urologist in the future. Patient prefers 2 month supplies of medication. Will follow up in 1 month and consider discontinuing metformin after next A1c check.     Medication Changes:  CONTINUE  Metformin 1000mg, take 0.5 tabs twice daily  Myrbetriq ER 25mg once daily  Ozempic 2mg once weekly    Monitoring and Education:  Counseled patient on relevant medication mechanisms of action, expectations, side effects, duration of therapy, contraindications, administration, and monitoring parameters.  All questions and concerns addressed. Contact pharmacist with any further questions or concerns prior to next appointment.  Reviewed BG goals: Fasting BG goal , Postprandial BG goal <180 mg/dL, A1C goal <7%%    Clinical Pharmacist follow-up: 8/25/25 @ 9:00 AM, [facility]  visit    Continue all meds under the continuation of care with the referring provider and clinical pharmacy team.    Thank you,  Citlalli Verdin, PharmD  Clinical Pharmacist  [phone]    Verbal consent to manage patient's drug therapy was obtained from the patient. They were informed they may decline to participate or withdraw from participation in pharmacy services at any time.       [1]   Allergies  Allergen Reactions    Ciprofloxacin Other     Myasthenia Gravis

## 2025-07-21 ENCOUNTER — APPOINTMENT (OUTPATIENT)
Dept: PHARMACY | Facility: HOSPITAL | Age: 75
End: 2025-07-21
Payer: MEDICARE

## 2025-07-21 DIAGNOSIS — N39.41 URGE INCONTINENCE OF URINE: ICD-10-CM

## 2025-07-21 DIAGNOSIS — E11.40 CONTROLLED TYPE 2 DIABETES MELLITUS WITH DIABETIC NEUROPATHY, WITHOUT LONG-TERM CURRENT USE OF INSULIN: Primary | ICD-10-CM

## 2025-08-05 PROBLEM — R41.3 MEMORY LOSS OR IMPAIRMENT: Status: ACTIVE | Noted: 2025-08-05

## 2025-08-05 NOTE — PROGRESS NOTES
"Nutrition Initial Assessment:     Patient Gavin Hawkins Jr. is a 75 y.o. male being seen at [facility] who was referred by Salvatore Madera DO on 6/26/25 for   1. Controlled type 2 diabetes mellitus with diabetic neuropathy, without long-term current use of insulin        2. Memory loss or impairment          Nutrition Assessment    Problem List[1]    Nutrition History:  Food & Nutrition History: Patient is having this visit to learn if there are dietary strategies that could promote improved glycemic control. His A1C increased in April after being lower the last few times it was checked. He said he was on a smaller dose of metformin during the interval of time the A1C increased. He lives with his wife and daughter - they are vegetarian but he isn't. He eats meat primarily when he's out of the house but sometimes eats it at home too.  Food Allergies:  (none);  Food Intolerances: beef - he is able to eat it but said he doesn't tolerate it as well as chicken  Mouth Issues:  (lost taste of food after antibiotics - before the pandemic. Flavor didn't come back)    Diet Recall:  Meal 1: B: mug of coffee with sugar, typically no breakfast  Meal 2: L: out for lunch such as a burger at [employer], or a bowl of chili on spaghetti from [employer], or [employer]  Meal 3: D: often salads, pasta, plant-based meats.  Fluid Intake: used to drink 6 mugs coffee/day, is down to 1-2 per day now. He adds sugar to coffee. No EtOH  Energy Intake: Good > 75 %    Food Preparation:  Cooking: Spouse/Significant Other, Family  Grocery Shopping: Spouse/Significant Other, Family  Dining Out: 1 to 3 times a week    Physical Activity:   He works in the garden. Doesn't exercise. He doesn't have a lot of energy, sometimes has a nap during the day.    Anthropometrics:  Height: 175.3 cm (5' 9\")   Weight: 98.2 kg (216 lb 6.4 oz)   BMI (Calculated): 31.94                 Weight History:   Daily Weight  08/07/25 : 98.2 kg (216 lb 6.4 " oz)  06/26/25 : 96.6 kg (213 lb)  05/23/25 : 96.9 kg (213 lb 9.6 oz)  05/20/25 : 97.1 kg (214 lb)  04/25/25 : 95.7 kg (211 lb)  01/23/25 : 99.3 kg (219 lb)  10/16/24 : 94.3 kg (207 lb 12.8 oz)  09/10/24 : 98.9 kg (218 lb)  09/05/24 : 98.9 kg (218 lb)  08/16/24 : 99 kg (218 lb 3.2 oz)    Weight Change %:  Weight History / % Weight Change: 260# about 4 years ago, has been losing weight gradually. He'd like to weigh less than 200#.    Nutrition Focused Physical Exam Findings:  Defer    Nutrition Significant Labs:  CBC Trend:   Recent Labs     06/09/25  0838 03/28/25  0912 08/28/24  1005 01/05/24  0847 10/03/23  0946   WBC 6.6 9.3 7.6 8.0 8.2   NRBC  --   --  0.0 0.0 0.0   RBC 4.36 4.38 4.96 4.87 4.59   HGB 12.4* 12.6* 13.9 13.5 13.0*   HCT 38.5 38.4* 43.4 41.0 40.2*   MCV 88.3 87.7 88 84 88   MCH 28.4 28.8 28.0 27.7 28.3   MCHC 32.2 32.8 32.0 32.9 32.3   RDW 12.7 13.5 13.9 13.5 13.9    196 191 203 210   , RFP + Serum Mag Trend:   Recent Labs     03/28/25  0912 08/28/24  1005 01/05/24  0847   GLUCOSE 105* 91 156*    138 138   K 4.2 3.6 3.6    98 100   CO2 27 28 29   ANIONGAP 12 16 13   BUN 17 17 16   CREATININE 0.87 1.15 1.00   EGFR 90 66 79   CALCIUM 9.8 9.4 9.0   ALBUMIN 4.3 4.2 4.3   MG 1.8 1.63  --    , LFT Trend:   Recent Labs     03/28/25  0912 08/28/24  1005 01/05/24  0847   ALBUMIN 4.3 4.2 4.3   BILITOT 0.6 0.9 0.6   ALKPHOS 70 53 60   ALT 15 18 37   AST 14 18 21   PROT 6.7 6.9 7.1   , DM Specific Labs Trend (Includes HgbA1C, antibodies & fasting insulin):   Recent Labs     04/25/25  0931 01/23/25  1000 08/16/24  0923   HGBA1C 7.1* 5.8 5.9   , Lipid Panel Trend:    Recent Labs     03/28/25  0912 08/28/24  1005 06/26/23  0953 04/20/22  0903 01/27/21  0937   CHOL 106 152 146 166 142   HDL 53 49.6 53.8 52.0 48.0   LDLCALC 35 82  --   --   --    LDLF  --   --  68 92 71   VLDL  --  21 24 22 23   TRIG 93 103 119 110 117   , Iron Panel + Serum Ferritin Trend:   Recent Labs     06/09/25  0838  "10/03/23  0946 04/20/22  0903   IRON 60 76 76   UIBC  --  313  --    TIBC 323 389 333   IRONSAT 19* 20* 23*   FERRITIN 43 46 58   , Vitamin B12:   Lab Results   Component Value Date    [id number] 905 06/09/2025    , Folate:   Lab Results   Component Value Date    FOLATE >24.0 06/09/2025    , Vitamin D: No results found for: \"VITD25\"     Medications:  Current Outpatient Medications   Medication Instructions    amLODIPine (NORVASC) 2.5 mg, oral, Daily, for blood pressure    ascorbic acid, vitamin C, 500 mg capsule Take by mouth.    atorvastatin (LIPITOR) 20 mg, oral, Daily    azelastine (Astelin) 137 mcg (0.1 %) nasal spray 2 sprays, Each Nostril, 2 times daily    fluticasone (Flonase) 50 mcg/actuation nasal spray 2 sprays, Each Nostril, Daily    L.acidoph-L.rhamn-B.bif-B.long (Probiotic Acidophilus Biobeads) 12.9 mg (2 billion cell) tablet,delayed release (DR/EC) 1 capsule, oral, Daily    losartan-hydrochlorothiazide (Hyzaar) 100-25 mg tablet 1 tablet, oral, Daily    metFORMIN (GLUCOPHAGE) 500 mg, oral, 2 times daily (morning and late afternoon)    Myrbetriq 25 mg, oral, Daily    omeprazole (PRILOSEC) 40 mg, oral, Daily before breakfast    ONETOUCH DELICA LANCETS MISC Fine; Use 1 to check glucose once daily    OneTouch Ultra Test strip USE 1 STRIP TO CHECK GLUCOSE ONCE DAILY    Ozempic 2 mg, subcutaneous, Weekly    pyridostigmine (MESTINON) 60 mg, oral, 4 times daily, 4 times daily    vit A,C and E-lutein-minerals (Ocuvite with Lutein) 300 mcg-200 mg-27 mg-2 mg tablet Take by mouth.    vitamin B complex tablet Super B complex tabs      Nutrition Diagnosis   Malnutrition Diagnosis  Patient has Malnutrition Diagnosis: No    Nutrition Diagnosis  Patient has Nutrition Diagnosis: Yes  Diagnosis Status (1): New  Nutrition Diagnosis 1: Altered nutrition related to laboratory values  Related to (1): endocrine dysfunction  As Evidenced by (1): A1C 7.1%       Nutrition Interventions/Recommendations   Nutrition Prescription: " Oral nutrition Reduced carbohydrate/carbohydrate controlled diet    Nutrition Interventions:   Food and Nutrient Delivery: Meals & Snacks: Carbohydrate-modified diet  Goals: 1. He'll use the plate method to limit carbohydrate to about 1/4 plate  Other:: Self Monitoring of Blood Glucose  Goals: 2. he will test blood glucose after some lunches and dinners and note down the results and the foods consumed to note whether food habits are leading to hyperglycemia     Nutrition Education:   Nutrition Education Content: Content related nutrition education  types of carbs/amount of carb to eat, SMBG    Educational Handouts Provided: ADA Plate Method      Readiness to Change : Good  Level of Understanding : Good  Anticipated Compliant : Good         Nutrition Monitoring and Evaluation   Food and Nutrient Intake  Monitoring and Evaluation Plan: Carbohydrate intake  Estimated carbohydrate intake: Estimated carbohydrate intake  Criteria: about 1/4 plate         Anthropometric measurements  Monitoring and Evaluation Plan: Weight    Biochemical Data, Medical Tests and Procedures  Monitoring and Evaluation Plan: Glucose/endocrine profile                  Follow Up: 9/18 at 0900    Time Spent  Prep time on day of patient encounter: 5 minutes  Time spent directly with patient, family or caregiver: 48 minutes  Additional Time Spent on Patient Care Activities: 0 minutes  Documentation Time: 10 minutes  Other Time Spent: 0 minutes  Total: 63 minutes             [1]   Patient Active Problem List  Diagnosis    Allergic rhinitis with postnasal drip    Anemia    BPH with elevated PSA    Cervical osteoarthritis    Colloid thyroid nodule    Controlled diabetes mellitus with diabetic neuropathy    GERD (gastroesophageal reflux disease)    Hypertension    Hyperlipidemia    Incidental lung nodule, > 3mm and < 8mm    Male erectile disorder    Melanoma (Multi)    Myasthenia gravis    Nodular goiter    Peripheral polyneuropathy    Ptosis    Urge  incontinence of urine    Upper gastrointestinal bleed    Neutropenia, unspecified type    TRUPTI (obstructive sleep apnea)    Obesity (BMI 30.0-34.9)    Lung nodule, solitary    Rhinitis medicamentosa    Nonsmoker    Memory loss or impairment

## 2025-08-07 ENCOUNTER — NUTRITION (OUTPATIENT)
Dept: PRIMARY CARE | Facility: CLINIC | Age: 75
End: 2025-08-07
Payer: MEDICARE

## 2025-08-07 ENCOUNTER — APPOINTMENT (OUTPATIENT)
Dept: PRIMARY CARE | Facility: CLINIC | Age: 75
End: 2025-08-07
Payer: MEDICARE

## 2025-08-07 VITALS — BODY MASS INDEX: 32.05 KG/M2 | WEIGHT: 216.4 LBS | HEIGHT: 69 IN

## 2025-08-07 DIAGNOSIS — R41.3 MEMORY LOSS OR IMPAIRMENT: ICD-10-CM

## 2025-08-07 DIAGNOSIS — E11.40 CONTROLLED TYPE 2 DIABETES MELLITUS WITH DIABETIC NEUROPATHY, WITHOUT LONG-TERM CURRENT USE OF INSULIN: Primary | ICD-10-CM

## 2025-08-07 PROCEDURE — 97802 MEDICAL NUTRITION INDIV IN: CPT | Performed by: DIETITIAN, REGISTERED

## 2025-08-07 NOTE — PATIENT INSTRUCTIONS
Utilize the Plate Method at meals in order to balance the types and amounts of food on the plate.   - half of the plate full of non-starchy vegetables. These foods contribute very little carbohydrate to the plate, and they add fiber to the meal. Salad, carrots, bell peppers, zucchini, broccoli, cauliflower, asparagus, radishes, carrots and green beans are a few examples of these foods. They can be served cooked or raw.    - one quarter of plate including protein foods. Examples can include meat, nuts, seeds, cheese, cottage cheese, nut butter, fish, seafood, tofu, and edamame.    - one quarter of the plate including carbohydrate foods. These foods include grains, fruit, legumes, starchy vegetables, milk and yogurt. Aim to eat at least half of the daily grains as whole grains.    2. Discussed that more strategic self-monitoring of his blood sugar could help him understand where to target his diet. Explained that 2 hours after a meal he should have blood sugar under 180 mg/dL. Encouraged him to do some post-lunch and post-dinner testing to gather more data.

## 2025-08-11 ENCOUNTER — TELEMEDICINE (OUTPATIENT)
Dept: NEUROLOGY | Facility: CLINIC | Age: 75
End: 2025-08-11
Payer: MEDICARE

## 2025-08-11 DIAGNOSIS — G70.00 MYASTHENIA GRAVIS: ICD-10-CM

## 2025-08-11 PROCEDURE — 1160F RVW MEDS BY RX/DR IN RCRD: CPT | Performed by: NURSE PRACTITIONER

## 2025-08-11 PROCEDURE — 1159F MED LIST DOCD IN RCRD: CPT | Performed by: NURSE PRACTITIONER

## 2025-08-11 PROCEDURE — 99214 OFFICE O/P EST MOD 30 MIN: CPT | Performed by: NURSE PRACTITIONER

## 2025-08-11 RX ORDER — PYRIDOSTIGMINE BROMIDE 60 MG/1
60 TABLET ORAL 4 TIMES DAILY
Qty: 360 TABLET | Refills: 1 | Status: SHIPPED | OUTPATIENT
Start: 2025-08-11

## 2025-08-25 ENCOUNTER — APPOINTMENT (OUTPATIENT)
Dept: PHARMACY | Facility: HOSPITAL | Age: 75
End: 2025-08-25
Payer: MEDICARE

## 2025-08-25 DIAGNOSIS — N39.41 URGE INCONTINENCE OF URINE: ICD-10-CM

## 2025-08-25 DIAGNOSIS — E11.40 CONTROLLED TYPE 2 DIABETES MELLITUS WITH DIABETIC NEUROPATHY, WITHOUT LONG-TERM CURRENT USE OF INSULIN: ICD-10-CM

## 2025-08-25 PROCEDURE — RXMED WILLOW AMBULATORY MEDICATION CHARGE

## 2025-08-25 RX ORDER — MIRABEGRON 25 MG/1
25 TABLET, FILM COATED, EXTENDED RELEASE ORAL DAILY
Qty: 90 TABLET | Refills: 3 | Status: SHIPPED | OUTPATIENT
Start: 2025-08-25

## 2025-08-26 ENCOUNTER — PHARMACY VISIT (OUTPATIENT)
Dept: PHARMACY | Facility: CLINIC | Age: 75
End: 2025-08-26
Payer: COMMERCIAL

## 2025-08-27 ENCOUNTER — PHARMACY VISIT (OUTPATIENT)
Dept: PHARMACY | Facility: CLINIC | Age: 75
End: 2025-08-27

## 2025-09-09 ENCOUNTER — APPOINTMENT (OUTPATIENT)
Dept: UROLOGY | Facility: CLINIC | Age: 75
End: 2025-09-09
Payer: MEDICARE

## 2025-09-18 ENCOUNTER — APPOINTMENT (OUTPATIENT)
Dept: PRIMARY CARE | Facility: CLINIC | Age: 75
End: 2025-09-18
Payer: MEDICARE

## 2025-10-20 ENCOUNTER — APPOINTMENT (OUTPATIENT)
Dept: PHARMACY | Facility: HOSPITAL | Age: 75
End: 2025-10-20
Payer: MEDICARE

## 2025-10-29 ENCOUNTER — APPOINTMENT (OUTPATIENT)
Dept: DERMATOLOGY | Facility: CLINIC | Age: 75
End: 2025-10-29
Payer: MEDICARE

## 2025-11-06 ENCOUNTER — APPOINTMENT (OUTPATIENT)
Dept: PRIMARY CARE | Facility: CLINIC | Age: 75
End: 2025-11-06
Payer: MEDICARE

## 2025-11-18 ENCOUNTER — APPOINTMENT (OUTPATIENT)
Facility: CLINIC | Age: 75
End: 2025-11-18
Payer: MEDICARE

## 2026-01-05 ENCOUNTER — APPOINTMENT (OUTPATIENT)
Dept: NEUROLOGY | Facility: CLINIC | Age: 76
End: 2026-01-05
Payer: MEDICARE

## 2026-05-21 ENCOUNTER — APPOINTMENT (OUTPATIENT)
Facility: CLINIC | Age: 76
End: 2026-05-21
Payer: MEDICARE